# Patient Record
Sex: FEMALE | Race: WHITE | NOT HISPANIC OR LATINO | Employment: FULL TIME | URBAN - METROPOLITAN AREA
[De-identification: names, ages, dates, MRNs, and addresses within clinical notes are randomized per-mention and may not be internally consistent; named-entity substitution may affect disease eponyms.]

---

## 2018-01-14 ENCOUNTER — HOSPITAL ENCOUNTER (EMERGENCY)
Facility: HOSPITAL | Age: 58
Discharge: HOME | End: 2018-01-14
Attending: EMERGENCY MEDICINE | Admitting: EMERGENCY MEDICINE

## 2018-01-14 VITALS
WEIGHT: 112 LBS | TEMPERATURE: 98.2 F | HEIGHT: 62 IN | SYSTOLIC BLOOD PRESSURE: 170 MMHG | DIASTOLIC BLOOD PRESSURE: 100 MMHG | OXYGEN SATURATION: 97 % | BODY MASS INDEX: 20.61 KG/M2 | RESPIRATION RATE: 16 BRPM | HEART RATE: 83 BPM

## 2018-01-14 DIAGNOSIS — E87.6 HYPOKALEMIA: Primary | ICD-10-CM

## 2018-01-14 LAB
ALBUMIN SERPL-MCNC: 5.1 G/DL (ref 3.5–5.2)
ALBUMIN/GLOB SERPL: 1.5 G/DL
ALP SERPL-CCNC: 96 U/L (ref 39–117)
ALT SERPL W P-5'-P-CCNC: 10 U/L (ref 1–33)
ANION GAP SERPL CALCULATED.3IONS-SCNC: 22 MMOL/L
AST SERPL-CCNC: 19 U/L (ref 1–32)
BASOPHILS # BLD AUTO: 0.02 10*3/MM3 (ref 0–0.2)
BASOPHILS NFR BLD AUTO: 0.3 % (ref 0–1.5)
BILIRUB SERPL-MCNC: 0.3 MG/DL (ref 0.1–1.2)
BUN BLD-MCNC: 8 MG/DL (ref 6–20)
BUN/CREAT SERPL: 10.7 (ref 7–25)
CALCIUM SPEC-SCNC: 9.2 MG/DL (ref 8.6–10.5)
CHLORIDE SERPL-SCNC: 90 MMOL/L (ref 98–107)
CO2 SERPL-SCNC: 26 MMOL/L (ref 22–29)
CREAT BLD-MCNC: 0.75 MG/DL (ref 0.57–1)
DEPRECATED RDW RBC AUTO: 44.8 FL (ref 37–54)
EOSINOPHIL # BLD AUTO: 0.03 10*3/MM3 (ref 0–0.7)
EOSINOPHIL NFR BLD AUTO: 0.4 % (ref 0.3–6.2)
ERYTHROCYTE [DISTWIDTH] IN BLOOD BY AUTOMATED COUNT: 12.9 % (ref 11.7–13)
ETHANOL BLD-MCNC: 65 MG/DL (ref 0–10)
ETHANOL UR QL: 0.07 %
GFR SERPL CREATININE-BSD FRML MDRD: 80 ML/MIN/1.73
GLOBULIN UR ELPH-MCNC: 3.3 GM/DL
GLUCOSE BLD-MCNC: 104 MG/DL (ref 65–99)
HCT VFR BLD AUTO: 42.9 % (ref 35.6–45.5)
HGB BLD-MCNC: 14.7 G/DL (ref 11.9–15.5)
IMM GRANULOCYTES # BLD: 0 10*3/MM3 (ref 0–0.03)
IMM GRANULOCYTES NFR BLD: 0 % (ref 0–0.5)
LYMPHOCYTES # BLD AUTO: 2.97 10*3/MM3 (ref 0.9–4.8)
LYMPHOCYTES NFR BLD AUTO: 37.2 % (ref 19.6–45.3)
MCH RBC QN AUTO: 32.5 PG (ref 26.9–32)
MCHC RBC AUTO-ENTMCNC: 34.3 G/DL (ref 32.4–36.3)
MCV RBC AUTO: 94.9 FL (ref 80.5–98.2)
MONOCYTES # BLD AUTO: 0.55 10*3/MM3 (ref 0.2–1.2)
MONOCYTES NFR BLD AUTO: 6.9 % (ref 5–12)
NEUTROPHILS # BLD AUTO: 4.41 10*3/MM3 (ref 1.9–8.1)
NEUTROPHILS NFR BLD AUTO: 55.2 % (ref 42.7–76)
PLATELET # BLD AUTO: 250 10*3/MM3 (ref 140–500)
PMV BLD AUTO: 9.9 FL (ref 6–12)
POTASSIUM BLD-SCNC: 2.9 MMOL/L (ref 3.5–5.2)
PROT SERPL-MCNC: 8.4 G/DL (ref 6–8.5)
RBC # BLD AUTO: 4.52 10*6/MM3 (ref 3.9–5.2)
SODIUM BLD-SCNC: 138 MMOL/L (ref 136–145)
WBC NRBC COR # BLD: 7.98 10*3/MM3 (ref 4.5–10.7)

## 2018-01-14 PROCEDURE — 85025 COMPLETE CBC W/AUTO DIFF WBC: CPT | Performed by: NURSE PRACTITIONER

## 2018-01-14 PROCEDURE — 80307 DRUG TEST PRSMV CHEM ANLYZR: CPT | Performed by: NURSE PRACTITIONER

## 2018-01-14 PROCEDURE — 80053 COMPREHEN METABOLIC PANEL: CPT | Performed by: NURSE PRACTITIONER

## 2018-01-14 PROCEDURE — 99284 EMERGENCY DEPT VISIT MOD MDM: CPT

## 2018-01-14 RX ORDER — MELATONIN
1000 DAILY
COMMUNITY

## 2018-01-14 RX ORDER — POTASSIUM CHLORIDE 1.5 G/1.77G
40 POWDER, FOR SOLUTION ORAL ONCE
Status: COMPLETED | OUTPATIENT
Start: 2018-01-14 | End: 2018-01-14

## 2018-01-14 RX ORDER — POTASSIUM CHLORIDE 750 MG/1
40 CAPSULE, EXTENDED RELEASE ORAL ONCE
Status: DISCONTINUED | OUTPATIENT
Start: 2018-01-14 | End: 2018-01-14 | Stop reason: HOSPADM

## 2018-01-14 RX ADMIN — POTASSIUM CHLORIDE 40 MEQ: 1.5 POWDER, FOR SOLUTION ORAL at 20:29

## 2018-01-14 NOTE — ED NOTES
Pt c/o left hand cramping and tingling since earlier today.  Pt denies chest pain or SOA. Pt has missed 2 doses of hydrochlorothiazide.      Jackie Davison RN  01/14/18 5901       Jackie Davison RN  01/14/18 6993

## 2018-01-15 NOTE — ED PROVIDER NOTES
" EMERGENCY DEPARTMENT ENCOUNTER    CHIEF COMPLAINT  Chief Complaint: Left hand cramping  History given by: Patient  History limited by: Nothing  Room Number: 17/17  PMD: Patience Brown MD      HPI:  Pt is a 57 y.o. female who presents complaining of left hand cramping and tingling that began today. Pt reports associated nausea.  Pt denies diarrhea, vomiting, and chest pain.  Pt states that she had not taken her potassium for the past few days    Duration:  PTA  Onset: Gradual  Timing: Constant  Location: Left hand  Radiation: None  Quality: \"Crampy\"  Intensity/Severity: Mild  Progression: Unchanged  Associated Symptoms: Nausea  Aggravating Factors: None  Alleviating Factors: None  Previous Episodes: None  Treatment before arrival: None      PAST MEDICAL HISTORY  Active Ambulatory Problems     Diagnosis Date Noted   • No Active Ambulatory Problems     Resolved Ambulatory Problems     Diagnosis Date Noted   • No Resolved Ambulatory Problems     Past Medical History:   Diagnosis Date   • Hypertension    • Osteoporosis        PAST SURGICAL HISTORY  Past Surgical History:   Procedure Laterality Date   • FEMUR FRACTURE SURGERY     • TUBAL ABDOMINAL LIGATION         FAMILY HISTORY  History reviewed. No pertinent family history.    SOCIAL HISTORY  Social History     Social History   • Marital status: Single     Spouse name: N/A   • Number of children: N/A   • Years of education: N/A     Occupational History   • Not on file.     Social History Main Topics   • Smoking status: Current Every Day Smoker     Packs/day: 1.00   • Smokeless tobacco: Not on file   • Alcohol use Yes      Comment: 2-3 beers per day   • Drug use: Yes     Special: Marijuana   • Sexual activity: Defer     Other Topics Concern   • Not on file     Social History Narrative   • No narrative on file       ALLERGIES  Lisinopril and Paroxetine    REVIEW OF SYSTEMS  Review of Systems   Constitutional: Negative for fever.   HENT: Negative for sore throat.  "   Eyes: Negative.    Respiratory: Negative for cough and shortness of breath.    Cardiovascular: Negative for chest pain.   Gastrointestinal: Positive for nausea. Negative for abdominal pain, diarrhea and vomiting.   Genitourinary: Negative for dysuria.   Musculoskeletal: Negative for neck pain.        Left hand cramp   Skin: Negative for rash.   Allergic/Immunologic: Negative.    Neurological: Negative for weakness, numbness and headaches.   Hematological: Negative.    Psychiatric/Behavioral: Negative.    All other systems reviewed and are negative.      PHYSICAL EXAM  ED Triage Vitals   Temp Heart Rate Resp BP SpO2   01/14/18 1755 01/14/18 1755 01/14/18 1755 01/14/18 1755 01/14/18 1755   98.1 °F (36.7 °C) 80 16 176/111 99 %      Temp src Heart Rate Source Patient Position BP Location FiO2 (%)   01/14/18 1755 01/14/18 2011 01/14/18 2011 01/14/18 2011 --   Oral Monitor Lying Left arm        Physical Exam   Constitutional: She is oriented to person, place, and time and well-developed, well-nourished, and in no distress. No distress.   HENT:   Head: Normocephalic and atraumatic.   Eyes: EOM are normal. Pupils are equal, round, and reactive to light.   Neck: Normal range of motion. Neck supple.   Cardiovascular: Normal rate, regular rhythm and normal heart sounds.    Pulmonary/Chest: Effort normal and breath sounds normal. No respiratory distress.   Abdominal: Soft. There is no tenderness. There is no rebound and no guarding.   Musculoskeletal: Normal range of motion. She exhibits no edema.   Neurological: She is alert and oriented to person, place, and time. She has normal sensation and normal strength.   Skin: Skin is warm and dry. No rash noted.   Psychiatric: Mood and affect normal.   Nursing note and vitals reviewed.      LAB RESULTS  Lab Results (last 24 hours)     Procedure Component Value Units Date/Time    CBC & Differential [[id number]] Collected:  01/14/18 1834    Specimen:  Blood Updated:  01/14/18 1852     Narrative:       The following orders were created for panel order CBC & Differential.  Procedure                               Abnormality         Status                     ---------                               -----------         ------                     CBC Auto Differential[[id number]]        Abnormal            Final result                 Please view results for these tests on the individual orders.    Comprehensive Metabolic Panel [[id number]]  (Abnormal) Collected:  01/14/18 1834    Specimen:  Blood Updated:  01/14/18 1910     Glucose 104 (H) mg/dL      BUN 8 mg/dL      Creatinine 0.75 mg/dL      Sodium 138 mmol/L      Potassium 2.9 (L) mmol/L      Chloride 90 (L) mmol/L      CO2 26.0 mmol/L      Calcium 9.2 mg/dL      Total Protein 8.4 g/dL      Albumin 5.10 g/dL      ALT (SGPT) 10 U/L      AST (SGOT) 19 U/L      Alkaline Phosphatase 96 U/L      Total Bilirubin 0.3 mg/dL      eGFR Non African Amer 80 mL/min/1.73      Globulin 3.3 gm/dL      A/G Ratio 1.5 g/dL      BUN/Creatinine Ratio 10.7     Anion Gap 22.0 mmol/L     Ethanol [[id number]]  (Abnormal) Collected:  01/14/18 1834    Specimen:  Blood Updated:  01/14/18 1910     Ethanol 65 (H) mg/dL      Ethanol % 0.065 %     CBC Auto Differential [[id number]]  (Abnormal) Collected:  01/14/18 1834    Specimen:  Blood Updated:  01/14/18 1852     WBC 7.98 10*3/mm3      RBC 4.52 10*6/mm3      Hemoglobin 14.7 g/dL      Hematocrit 42.9 %      MCV 94.9 fL      MCH 32.5 (H) pg      MCHC 34.3 g/dL      RDW 12.9 %      RDW-SD 44.8 fl      MPV 9.9 fL      Platelets 250 10*3/mm3      Neutrophil % 55.2 %      Lymphocyte % 37.2 %      Monocyte % 6.9 %      Eosinophil % 0.4 %      Basophil % 0.3 %      Immature Grans % 0.0 %      Neutrophils, Absolute 4.41 10*3/mm3      Lymphocytes, Absolute 2.97 10*3/mm3      Monocytes, Absolute 0.55 10*3/mm3      Eosinophils, Absolute 0.03 10*3/mm3      Basophils, Absolute 0.02 10*3/mm3      Immature Grans, Absolute 0.00 10*3/mm3            I ordered the above labs and reviewed the results      PROCEDURES  Procedures      PROGRESS AND CONSULTS  ED Course       2026   BP- (!) 173/103 HR- 83 Temp- 98.2 °F (36.8 °C) (Tympanic) O2 sat- 97%.  Discussed pertinent lab results. Advised Pt to double up on potassium for the next few days. Discussed the plan to discharge. Patient agrees with plan and all questions were addressed.         MEDICAL DECISION MAKING  Results were reviewed/discussed with the patient and they were also made aware of online access. Pt also made aware that some labs, such as cultures, will not be resulted during ER visit and follow up with PMD is necessary.     MDM       DIAGNOSIS  Final diagnoses:   Hypokalemia       DISPOSITION  DISCHARGE    Patient discharged in stable condition.    Reviewed implications of results, diagnosis, meds, responsibility to follow up, warning signs and symptoms of possible worsening, potential complications and reasons to return to ER.    Patient/Family voiced understanding of above instructions.    Discussed plan for discharge, as there is no emergent indication for admission.  Pt/family is agreeable and understands need for follow up and repeat testing.  Pt is aware that discharge does not mean that nothing is wrong but it indicates no emergency is present that requires admission and they must continue care with follow-up as given below or physician of their choice.     FOLLOW-UP  Patience Brown MD  [address] [address]  [phone]               Medication List      Notice     No changes were made to your prescriptions during this visit.            Latest Documented Vital Signs:  As of 8:55 PM  BP- (!) 173/103 HR- 83 Temp- 98.2 °F (36.8 °C) (Tympanic) O2 sat- 97%    --  Documentation assistance provided by helen Velazquez for Dr. Simons.  Information recorded by the helen was done at my direction and has been verified and validated by me.     Winnie  Marcus  01/14/18 2052       Virgilio Simons MD  01/14/18 7852

## 2021-10-11 ENCOUNTER — ANESTHESIA EVENT (OUTPATIENT)
Dept: PERIOP | Facility: HOSPITAL | Age: 61
End: 2021-10-11

## 2021-10-11 ENCOUNTER — ANESTHESIA (OUTPATIENT)
Dept: PERIOP | Facility: HOSPITAL | Age: 61
End: 2021-10-11

## 2021-10-11 ENCOUNTER — HOSPITAL ENCOUNTER (EMERGENCY)
Facility: HOSPITAL | Age: 61
Discharge: HOME | End: 2021-10-11
Attending: EMERGENCY MEDICINE | Admitting: INTERNAL MEDICINE

## 2021-10-11 VITALS
WEIGHT: 115 LBS | HEIGHT: 62 IN | OXYGEN SATURATION: 91 % | RESPIRATION RATE: 16 BRPM | BODY MASS INDEX: 21.16 KG/M2 | TEMPERATURE: 97.7 F | HEART RATE: 80 BPM | DIASTOLIC BLOOD PRESSURE: 83 MMHG | SYSTOLIC BLOOD PRESSURE: 141 MMHG

## 2021-10-11 DIAGNOSIS — T18.128A ESOPHAGEAL OBSTRUCTION DUE TO FOOD IMPACTION: Primary | ICD-10-CM

## 2021-10-11 DIAGNOSIS — K22.2 ESOPHAGEAL OBSTRUCTION DUE TO FOOD IMPACTION: Primary | ICD-10-CM
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
## 2021-10-11 LAB
ALBUMIN SERPL-MCNC: 4.9 G/DL (ref 3.5–5.2)
ALBUMIN/GLOB SERPL: 1.8 G/DL
ALP SERPL-CCNC: 92 U/L (ref 39–117)
ALT SERPL W P-5'-P-CCNC: 14 U/L (ref 1–33)
ANION GAP SERPL CALCULATED.3IONS-SCNC: 14 MMOL/L (ref 5–15)
AST SERPL-CCNC: 19 U/L (ref 1–32)
BASOPHILS # BLD AUTO: 0.02 10*3/MM3 (ref 0–0.2)
BASOPHILS NFR BLD AUTO: 0.3 % (ref 0–1.5)
BILIRUB SERPL-MCNC: 0.5 MG/DL (ref 0–1.2)
BUN SERPL-MCNC: 10 MG/DL (ref 6–20)
BUN/CREAT SERPL: 19.2 (ref 7–25)
CALCIUM SPEC-SCNC: 9 MG/DL (ref 8.6–10.5)
CHLORIDE SERPL-SCNC: 101 MMOL/L (ref 98–107)
CO2 SERPL-SCNC: 28 MMOL/L (ref 22–29)
CREAT SERPL-MCNC: 0.52 MG/DL (ref 0.57–1)
DEPRECATED RDW RBC AUTO: 40.1 FL (ref 37–54)
EOSINOPHIL # BLD AUTO: 0.05 10*3/MM3 (ref 0–0.4)
EOSINOPHIL NFR BLD AUTO: 0.8 % (ref 0.3–6.2)
ERYTHROCYTE [DISTWIDTH] IN BLOOD BY AUTOMATED COUNT: 12 % (ref 12.3–15.4)
GFR SERPL CREATININE-BSD FRML MDRD: 121 ML/MIN/1.73
GLOBULIN UR ELPH-MCNC: 2.7 GM/DL
GLUCOSE SERPL-MCNC: 97 MG/DL (ref 65–99)
HCT VFR BLD AUTO: 40.1 % (ref 34–46.6)
HGB BLD-MCNC: 13.8 G/DL (ref 12–15.9)
IMM GRANULOCYTES # BLD AUTO: 0.02 10*3/MM3 (ref 0–0.05)
IMM GRANULOCYTES NFR BLD AUTO: 0.3 % (ref 0–0.5)
LYMPHOCYTES # BLD AUTO: 1.85 10*3/MM3 (ref 0.7–3.1)
LYMPHOCYTES NFR BLD AUTO: 30.6 % (ref 19.6–45.3)
MCH RBC QN AUTO: 31.7 PG (ref 26.6–33)
MCHC RBC AUTO-ENTMCNC: 34.4 G/DL (ref 31.5–35.7)
MCV RBC AUTO: 92 FL (ref 79–97)
MONOCYTES # BLD AUTO: 0.46 10*3/MM3 (ref 0.1–0.9)
MONOCYTES NFR BLD AUTO: 7.6 % (ref 5–12)
NEUTROPHILS NFR BLD AUTO: 3.65 10*3/MM3 (ref 1.7–7)
NEUTROPHILS NFR BLD AUTO: 60.4 % (ref 42.7–76)
NRBC BLD AUTO-RTO: 0 /100 WBC (ref 0–0.2)
PLATELET # BLD AUTO: 259 10*3/MM3 (ref 140–450)
PMV BLD AUTO: 9.2 FL (ref 6–12)
POTASSIUM SERPL-SCNC: 3.1 MMOL/L (ref 3.5–5.2)
PROT SERPL-MCNC: 7.6 G/DL (ref 6–8.5)
RBC # BLD AUTO: 4.36 10*6/MM3 (ref 3.77–5.28)
SARS-COV-2 RNA RESP QL NAA+PROBE: NOT DETECTED
SODIUM SERPL-SCNC: 143 MMOL/L (ref 136–145)
WBC # BLD AUTO: 6.05 10*3/MM3 (ref 3.4–10.8)

## 2021-10-11 PROCEDURE — 25010000002 FENTANYL CITRATE (PF) 50 MCG/ML SOLUTION: Performed by: ANESTHESIOLOGY

## 2021-10-11 PROCEDURE — U0003 INFECTIOUS AGENT DETECTION BY NUCLEIC ACID (DNA OR RNA); SEVERE ACUTE RESPIRATORY SYNDROME CORONAVIRUS 2 (SARS-COV-2) (CORONAVIRUS DISEASE [COVID-19]), AMPLIFIED PROBE TECHNIQUE, MAKING USE OF HIGH THROUGHPUT TECHNOLOGIES AS DESCRIBED BY CMS-2020-01-R: HCPCS | Performed by: PHYSICIAN ASSISTANT

## 2021-10-11 PROCEDURE — 25010000002 ONDANSETRON PER 1 MG: Performed by: ANESTHESIOLOGY

## 2021-10-11 PROCEDURE — 99284 EMERGENCY DEPT VISIT MOD MDM: CPT

## 2021-10-11 PROCEDURE — 80053 COMPREHEN METABOLIC PANEL: CPT | Performed by: EMERGENCY MEDICINE

## 2021-10-11 PROCEDURE — 25010000002 LORAZEPAM PER 2 MG: Performed by: EMERGENCY MEDICINE

## 2021-10-11 PROCEDURE — 25010000002 DEXAMETHASONE PER 1 MG: Performed by: ANESTHESIOLOGY

## 2021-10-11 PROCEDURE — 25010000002 MIDAZOLAM PER 1 MG: Performed by: ANESTHESIOLOGY

## 2021-10-11 PROCEDURE — 99284 EMERGENCY DEPT VISIT MOD MDM: CPT | Performed by: INTERNAL MEDICINE

## 2021-10-11 PROCEDURE — 85025 COMPLETE CBC W/AUTO DIFF WBC: CPT | Performed by: EMERGENCY MEDICINE

## 2021-10-11 PROCEDURE — 25010000002 PROPOFOL 1000 MG/100ML EMULSION: Performed by: ANESTHESIOLOGY

## 2021-10-11 PROCEDURE — 96374 THER/PROPH/DIAG INJ IV PUSH: CPT

## 2021-10-11 PROCEDURE — C9803 HOPD COVID-19 SPEC COLLECT: HCPCS

## 2021-10-11 PROCEDURE — 25010000002 SUCCINYLCHOLINE PER 20 MG: Performed by: ANESTHESIOLOGY

## 2021-10-11 PROCEDURE — 43247 EGD REMOVE FOREIGN BODY: CPT | Performed by: INTERNAL MEDICINE

## 2021-10-11 RX ORDER — FAMOTIDINE 10 MG/ML
20 INJECTION, SOLUTION INTRAVENOUS ONCE
Status: COMPLETED | OUTPATIENT
Start: 2021-10-11 | End: 2021-10-11

## 2021-10-11 RX ORDER — FAMOTIDINE 10 MG/ML
20 INJECTION, SOLUTION INTRAVENOUS ONCE
Status: DISCONTINUED | OUTPATIENT
Start: 2021-10-11 | End: 2021-10-11 | Stop reason: HOSPADM

## 2021-10-11 RX ORDER — SODIUM CHLORIDE 0.9 % (FLUSH) 0.9 %
10 SYRINGE (ML) INJECTION AS NEEDED
Status: DISCONTINUED | OUTPATIENT
Start: 2021-10-11 | End: 2021-10-11 | Stop reason: HOSPADM

## 2021-10-11 RX ORDER — PROMETHAZINE HYDROCHLORIDE 25 MG/1
25 TABLET ORAL ONCE AS NEEDED
Status: DISCONTINUED | OUTPATIENT
Start: 2021-10-11 | End: 2021-10-11 | Stop reason: HOSPADM

## 2021-10-11 RX ORDER — SODIUM CHLORIDE, SODIUM LACTATE, POTASSIUM CHLORIDE, CALCIUM CHLORIDE 600; 310; 30; 20 MG/100ML; MG/100ML; MG/100ML; MG/100ML
30 INJECTION, SOLUTION INTRAVENOUS CONTINUOUS
Status: CANCELLED | OUTPATIENT
Start: 2021-10-11

## 2021-10-11 RX ORDER — ONDANSETRON 2 MG/ML
INJECTION INTRAMUSCULAR; INTRAVENOUS AS NEEDED
Status: DISCONTINUED | OUTPATIENT
Start: 2021-10-11 | End: 2021-10-11 | Stop reason: SURG

## 2021-10-11 RX ORDER — MIDAZOLAM HYDROCHLORIDE 1 MG/ML
1 INJECTION INTRAMUSCULAR; INTRAVENOUS
Status: DISCONTINUED | OUTPATIENT
Start: 2021-10-11 | End: 2021-10-11 | Stop reason: HOSPADM

## 2021-10-11 RX ORDER — PANTOPRAZOLE SODIUM 20 MG/1
20 TABLET, DELAYED RELEASE ORAL
Qty: 90 TABLET | Refills: 0 | Status: SHIPPED | OUTPATIENT
Start: 2021-10-11 | End: 2021-10-28 | Stop reason: SDUPTHER

## 2021-10-11 RX ORDER — SUCCINYLCHOLINE CHLORIDE 20 MG/ML
INJECTION INTRAMUSCULAR; INTRAVENOUS AS NEEDED
Status: DISCONTINUED | OUTPATIENT
Start: 2021-10-11 | End: 2021-10-11 | Stop reason: SURG

## 2021-10-11 RX ORDER — HYDROMORPHONE HYDROCHLORIDE 1 MG/ML
0.25 INJECTION, SOLUTION INTRAMUSCULAR; INTRAVENOUS; SUBCUTANEOUS
Status: DISCONTINUED | OUTPATIENT
Start: 2021-10-11 | End: 2021-10-11 | Stop reason: HOSPADM

## 2021-10-11 RX ORDER — DIPHENHYDRAMINE HCL 25 MG
25 CAPSULE ORAL
Status: DISCONTINUED | OUTPATIENT
Start: 2021-10-11 | End: 2021-10-11 | Stop reason: HOSPADM

## 2021-10-11 RX ORDER — DIPHENHYDRAMINE HYDROCHLORIDE 50 MG/ML
12.5 INJECTION INTRAMUSCULAR; INTRAVENOUS
Status: DISCONTINUED | OUTPATIENT
Start: 2021-10-11 | End: 2021-10-11 | Stop reason: HOSPADM

## 2021-10-11 RX ORDER — LIDOCAINE HYDROCHLORIDE 10 MG/ML
0.5 INJECTION, SOLUTION EPIDURAL; INFILTRATION; INTRACAUDAL; PERINEURAL ONCE AS NEEDED
Status: DISCONTINUED | OUTPATIENT
Start: 2021-10-11 | End: 2021-10-11 | Stop reason: HOSPADM

## 2021-10-11 RX ORDER — HYDRALAZINE HYDROCHLORIDE 20 MG/ML
5 INJECTION INTRAMUSCULAR; INTRAVENOUS
Status: DISCONTINUED | OUTPATIENT
Start: 2021-10-11 | End: 2021-10-11 | Stop reason: HOSPADM

## 2021-10-11 RX ORDER — PROPOFOL 10 MG/ML
INJECTION, EMULSION INTRAVENOUS AS NEEDED
Status: DISCONTINUED | OUTPATIENT
Start: 2021-10-11 | End: 2021-10-11 | Stop reason: SURG

## 2021-10-11 RX ORDER — FENTANYL CITRATE 50 UG/ML
50 INJECTION, SOLUTION INTRAMUSCULAR; INTRAVENOUS
Status: DISCONTINUED | OUTPATIENT
Start: 2021-10-11 | End: 2021-10-11 | Stop reason: HOSPADM

## 2021-10-11 RX ORDER — FLUMAZENIL 0.1 MG/ML
0.2 INJECTION INTRAVENOUS AS NEEDED
Status: DISCONTINUED | OUTPATIENT
Start: 2021-10-11 | End: 2021-10-11 | Stop reason: HOSPADM

## 2021-10-11 RX ORDER — PROMETHAZINE HYDROCHLORIDE 25 MG/1
25 SUPPOSITORY RECTAL ONCE AS NEEDED
Status: DISCONTINUED | OUTPATIENT
Start: 2021-10-11 | End: 2021-10-11 | Stop reason: HOSPADM

## 2021-10-11 RX ORDER — SODIUM CHLORIDE 0.9 % (FLUSH) 0.9 %
10 SYRINGE (ML) INJECTION EVERY 12 HOURS SCHEDULED
Status: DISCONTINUED | OUTPATIENT
Start: 2021-10-11 | End: 2021-10-11 | Stop reason: HOSPADM

## 2021-10-11 RX ORDER — DEXAMETHASONE SODIUM PHOSPHATE 4 MG/ML
INJECTION, SOLUTION INTRA-ARTICULAR; INTRALESIONAL; INTRAMUSCULAR; INTRAVENOUS; SOFT TISSUE AS NEEDED
Status: DISCONTINUED | OUTPATIENT
Start: 2021-10-11 | End: 2021-10-11 | Stop reason: SURG

## 2021-10-11 RX ORDER — FENTANYL CITRATE 50 UG/ML
INJECTION, SOLUTION INTRAMUSCULAR; INTRAVENOUS AS NEEDED
Status: DISCONTINUED | OUTPATIENT
Start: 2021-10-11 | End: 2021-10-11 | Stop reason: SURG

## 2021-10-11 RX ORDER — ONDANSETRON 2 MG/ML
4 INJECTION INTRAMUSCULAR; INTRAVENOUS ONCE AS NEEDED
Status: DISCONTINUED | OUTPATIENT
Start: 2021-10-11 | End: 2021-10-11 | Stop reason: HOSPADM

## 2021-10-11 RX ORDER — SODIUM CHLORIDE, SODIUM LACTATE, POTASSIUM CHLORIDE, CALCIUM CHLORIDE 600; 310; 30; 20 MG/100ML; MG/100ML; MG/100ML; MG/100ML
9 INJECTION, SOLUTION INTRAVENOUS CONTINUOUS PRN
Status: DISCONTINUED | OUTPATIENT
Start: 2021-10-11 | End: 2021-10-11 | Stop reason: HOSPADM

## 2021-10-11 RX ORDER — EPHEDRINE SULFATE 50 MG/ML
5 INJECTION, SOLUTION INTRAVENOUS ONCE AS NEEDED
Status: DISCONTINUED | OUTPATIENT
Start: 2021-10-11 | End: 2021-10-11 | Stop reason: HOSPADM

## 2021-10-11 RX ORDER — LIDOCAINE HYDROCHLORIDE 20 MG/ML
INJECTION, SOLUTION INFILTRATION; PERINEURAL AS NEEDED
Status: DISCONTINUED | OUTPATIENT
Start: 2021-10-11 | End: 2021-10-11 | Stop reason: SURG

## 2021-10-11 RX ORDER — SODIUM CHLORIDE 0.9 % (FLUSH) 0.9 %
3 SYRINGE (ML) INJECTION EVERY 12 HOURS SCHEDULED
Status: DISCONTINUED | OUTPATIENT
Start: 2021-10-11 | End: 2021-10-11 | Stop reason: HOSPADM

## 2021-10-11 RX ORDER — SODIUM CHLORIDE, SODIUM LACTATE, POTASSIUM CHLORIDE, CALCIUM CHLORIDE 600; 310; 30; 20 MG/100ML; MG/100ML; MG/100ML; MG/100ML
9 INJECTION, SOLUTION INTRAVENOUS CONTINUOUS
Status: DISCONTINUED | OUTPATIENT
Start: 2021-10-11 | End: 2021-10-11 | Stop reason: HOSPADM

## 2021-10-11 RX ORDER — HYDROCODONE BITARTRATE AND ACETAMINOPHEN 7.5; 325 MG/1; MG/1
1 TABLET ORAL ONCE AS NEEDED
Status: DISCONTINUED | OUTPATIENT
Start: 2021-10-11 | End: 2021-10-11 | Stop reason: HOSPADM

## 2021-10-11 RX ORDER — OXYCODONE AND ACETAMINOPHEN 10; 325 MG/1; MG/1
1 TABLET ORAL EVERY 4 HOURS PRN
Status: DISCONTINUED | OUTPATIENT
Start: 2021-10-11 | End: 2021-10-11 | Stop reason: HOSPADM

## 2021-10-11 RX ORDER — SODIUM CHLORIDE 0.9 % (FLUSH) 0.9 %
3-10 SYRINGE (ML) INJECTION AS NEEDED
Status: DISCONTINUED | OUTPATIENT
Start: 2021-10-11 | End: 2021-10-11 | Stop reason: HOSPADM

## 2021-10-11 RX ORDER — NALOXONE HCL 0.4 MG/ML
0.2 VIAL (ML) INJECTION AS NEEDED
Status: DISCONTINUED | OUTPATIENT
Start: 2021-10-11 | End: 2021-10-11 | Stop reason: HOSPADM

## 2021-10-11 RX ORDER — LABETALOL HYDROCHLORIDE 5 MG/ML
5 INJECTION, SOLUTION INTRAVENOUS
Status: DISCONTINUED | OUTPATIENT
Start: 2021-10-11 | End: 2021-10-11 | Stop reason: HOSPADM

## 2021-10-11 RX ORDER — LORAZEPAM 2 MG/ML
1 INJECTION INTRAMUSCULAR ONCE
Status: COMPLETED | OUTPATIENT
Start: 2021-10-11 | End: 2021-10-11

## 2021-10-11 RX ADMIN — SUCCINYLCHOLINE CHLORIDE 120 MG: 20 INJECTION, SOLUTION INTRAMUSCULAR; INTRAVENOUS; PARENTERAL at 11:34

## 2021-10-11 RX ADMIN — PROPOFOL 150 MG: 10 INJECTION, EMULSION INTRAVENOUS at 11:34

## 2021-10-11 RX ADMIN — SODIUM CHLORIDE, POTASSIUM CHLORIDE, SODIUM LACTATE AND CALCIUM CHLORIDE: 600; 310; 30; 20 INJECTION, SOLUTION INTRAVENOUS at 11:26

## 2021-10-11 RX ADMIN — ONDANSETRON 4 MG: 2 INJECTION INTRAMUSCULAR; INTRAVENOUS at 11:39

## 2021-10-11 RX ADMIN — LORAZEPAM 1 MG: 2 INJECTION INTRAMUSCULAR; INTRAVENOUS at 07:37

## 2021-10-11 RX ADMIN — LIDOCAINE HYDROCHLORIDE 50 MG: 20 INJECTION, SOLUTION INFILTRATION; PERINEURAL at 11:34

## 2021-10-11 RX ADMIN — FAMOTIDINE 20 MG: 10 INJECTION INTRAVENOUS at 10:57

## 2021-10-11 RX ADMIN — FENTANYL CITRATE 50 MCG: 0.05 INJECTION, SOLUTION INTRAMUSCULAR; INTRAVENOUS at 11:32

## 2021-10-11 RX ADMIN — FENTANYL CITRATE 50 MCG: 0.05 INJECTION, SOLUTION INTRAMUSCULAR; INTRAVENOUS at 11:51

## 2021-10-11 RX ADMIN — MIDAZOLAM 1 MG: 1 INJECTION INTRAMUSCULAR; INTRAVENOUS at 11:11

## 2021-10-11 RX ADMIN — DEXAMETHASONE SODIUM PHOSPHATE 8 MG: 4 INJECTION, SOLUTION INTRAMUSCULAR; INTRAVENOUS at 11:39

## 2021-10-11 RX ADMIN — SODIUM CHLORIDE, POTASSIUM CHLORIDE, SODIUM LACTATE AND CALCIUM CHLORIDE 9 ML/HR: 600; 310; 30; 20 INJECTION, SOLUTION INTRAVENOUS at 11:11

## 2021-10-11 NOTE — ED TRIAGE NOTES
Pt presents to ED with complaints of having a piece of steak stuck in her throat since last PM. Pt is able to talk and secretions are controlled at this time.    RN wore appropriate PPE during entire encounter with patient. Patient compliant with wearing mask at this time. Proper hand hygiene performed before encounter, as deemed necessary during encounter and after completion of encounter with patient.

## 2021-10-11 NOTE — ANESTHESIA PREPROCEDURE EVALUATION
" Anesthesia Evaluation     Patient summary reviewed and Nursing notes reviewed   NPO Solid Status: > 8 hours  NPO Liquid Status: > 8 hours           Airway   Mallampati: II  TM distance: >3 FB  Neck ROM: full  No difficulty expected  Dental    (+) poor dentition        Pulmonary    (+) a smoker Current, COPD, decreased breath sounds,   Cardiovascular - normal exam  Exercise tolerance: good (4-7 METS)    (+) hypertension,       Neuro/Psych- negative ROS  GI/Hepatic/Renal/Endo - negative ROS     Musculoskeletal (-) negative ROS    Abdominal    Substance History   (+) alcohol use, drug use     OB/GYN negative ob/gyn ROS         Other - negative ROS           Phys Exam Other: No \"loose\" teeth per patient                Anesthesia Plan    ASA 3 - emergent     general   Rapid sequence(GETA-RSI    I have reviewed the patient's history with the patient and the chart, including all pertinent laboratory results and imaging. I have explained the risks of anesthesia including but not limited to dental damage, corneal abrasion, nerve injury, MI, stroke, and death. Questions asked and answered. Anesthetic plan discussed with patient and team as indicated. Patient expressed understanding of the above.  )  intravenous induction     Anesthetic plan, all risks, benefits, and alternatives have been provided, discussed and informed consent has been obtained with: patient.      "

## 2021-10-11 NOTE — CONSULTS
[facility]  Initial Inpatient Consult Note    Referring Provider: Dr Duffy    Reason for Consultation: Food bolus    Subjective     History of present illness:      Thank you for requesting my opinion.    This is a 60-year-old woman with a past medical history as below including tobacco abuse and daily alcohol use here for esophageal food bolus.  She was eating steak last evening.  She felt a piece of a get stuck.  Since that time, she has been unable to tolerate secretions.  She feels that it is stuck in her mid esophageal region.  She tried to vomit without success.  She has had these episodes occur intermittently, approximately 1-2 times per month but each time either she has been able to vomit the food up or have passed out.  She denies that she has any issues with heartburn or reflux.  She has never had an EGD evaluation.  She denies a family history of any GI malignancies though reports that her mother did have issues with reflux and a hiatal hernia.    She smokes 1 pack/day.  She drinks 2-3 beers per day.    Past Medical History:  Past Medical History:   Diagnosis Date   • Hypertension    • Osteoporosis        Past Surgical History:  Past Surgical History:   Procedure Laterality Date   • FEMUR FRACTURE SURGERY     • TUBAL ABDOMINAL LIGATION          Social History:   Social History     Tobacco Use   • Smoking status: Current Every Day Smoker     Packs/day: 1.00   • Smokeless tobacco: Not on file   Substance Use Topics   • Alcohol use: Yes     Comment: 2-3 beers per day        Family History:  No family history on file.    Home Meds:  Alendronate Sodium (FOSAMAX PO)    cholecalciferol (VITAMIN D3) 1000 units tablet    HYDROCHLOROTHIAZIDE PO    POTASSIUM PO    Current Meds:        Allergies:  Allergies   Allergen Reactions   • Lisinopril Swelling     Lip swelling   • Paroxetine Other (See Comments)     Ataxia       Review of Systems  All systems were reviewed and negative except for:   Gastrointestinal: positive for  dysphagia     Objective     Vital Signs  Temp:  [98.1 °F (36.7 °C)] 98.1 °F (36.7 °C)  Heart Rate:  [108] 108  Resp:  [16] 16  BP: (137)/(83) 137/83    Physical Exam:  Constitutional:   Alert, cooperative, in mild distress, appears stated age   Eyes:           Lids and lashes normal, conjunctivae and sclerae normal,   no icterus   Ears, nose, mouth and throat:  Normal appearance of external ears and nose, no oral l  lesions, no thrush, oral mucosa moist   Respiratory:    Clear to auscultation, respirations regular, even and             unlabored    Cardiovascular:   Regular rhythm and normal rate, normal S1 and S2, no        murmur, no gallop, palpable distal pulses, no lower extremity edema   Gastrointestinal:    Soft, nondistended, nontender to palpation, no guarding, no rebound tenderness, normal bowel sounds, no palpable masses or organomegaly  Rectal exam: deferred   Musculoskeletal:  Normal station, no atrophy, no tenderness to palpation, normal digits and nails   Skin:  Normal color, no bleeding, bruising, rashes or lesions   Lymphatics:  No palpable cervical or supraclavicular adenopathy   Psychiatric:  Judgement and insight: normal   Orientation to person, place and time: normal   Mood and affect: normal       Results Review:   I reviewed the patient's new clinical results.    Results from last 7 days   Lab Units 10/11/21  0731   WBC 10*3/mm3 6.05   HEMOGLOBIN g/dL 13.8   HEMATOCRIT % 40.1   PLATELETS 10*3/mm3 259       Results from last 7 days   Lab Units 10/11/21  0731   SODIUM mmol/L 143   POTASSIUM mmol/L 3.1*   CHLORIDE mmol/L 101   CO2 mmol/L 28.0   BUN mg/dL 10   CREATININE mg/dL 0.52*   CALCIUM mg/dL 9.0   BILIRUBIN mg/dL 0.5   ALK PHOS U/L 92   ALT (SGPT) U/L 14   AST (SGOT) U/L 19   GLUCOSE mg/dL 97             No results found for: LIPASE    Radiology:  Imaging Results (Last 72 Hours)     ** No results found for the last 72 hours. **          Assessment/Plan    Esophageal food bolus  Dysphagia    Impression  1.  Esophageal food bolus: Acute on chronic issue.    2.  Chronic intermittent dysphagia    3.  Tobacco abuse    4.  Daily alcohol use    Plan  Proceed with EGD in the OR for food bolus removal.  I discussed the procedure, risk of bleeding and perforation.  I discussed with her the need for a soft food diet and likely ongoing intervention following her procedure.  She verbalizes understanding and agrees to proceed    Will await Covid testing results which are pending.    I discussed the patients findings and my recommendations with patient    All necessary PPE, including face mask and eye protection, were worn during this encounter.  Hand sanitization was performed both before and after the patient interaction.    Connie Kumar MD  [facility]      Dictated utilizing Dragon dictation

## 2021-10-11 NOTE — ANESTHESIA POSTPROCEDURE EVALUATION
Patient: Rita Christopher    Procedure Summary     Date: 10/11/21 Room / Location: [facility] OR  / [facility]IN OR    Anesthesia Start: 1126 Anesthesia Stop: 1210    Procedure: ESOPHAGOGASTRODUODENOSCOPY with possible interventions   (N/A Esophagus) Diagnosis:       Esophageal obstruction due to food impaction      (Esophageal obstruction due to food impaction [K22.2, T18.128A])    Surgeons: Connie Kumar MD Provider: Carlos Lewis MD    Anesthesia Type: general ASA Status: 3 - Emergent          Anesthesia Type: general    Vitals  Vitals Value Taken Time   /79 10/11/21 1231   Temp 36.5 °C (97.7 °F) 10/11/21 1210   Pulse 76 10/11/21 1244   Resp 16 10/11/21 1240   SpO2 92 % 10/11/21 1244   Vitals shown include unvalidated device data.        Post Anesthesia Care and Evaluation    Patient location during evaluation: PACU  Patient participation: complete - patient participated  Level of consciousness: awake and alert  Pain management: adequate  Airway patency: patent  Anesthetic complications: No anesthetic complications    Cardiovascular status: acceptable  Respiratory status: acceptable  Hydration status: acceptable    Comments: --------------------            10/11/21               1300     --------------------   BP:       141/83     Pulse:      80       Resp:       16       Temp:                SpO2:      95%      --------------------

## 2021-10-11 NOTE — ANESTHESIA PROCEDURE NOTES
Airway  Urgency: elective    Date/Time: 10/11/2021 11:35 AM  Difficult airway    General Information and Staff    Patient location during procedure: OR  Anesthesiologist: Carlos Lewis MD    Indications and Patient Condition  Indications for airway management: airway protection    Preoxygenated: yes  Mask difficulty assessment: 1 - vent by mask    Final Airway Details  Final airway type: endotracheal airway      Successful airway: ETT  Cuffed: yes   Successful intubation technique: direct laryngoscopy and video laryngoscopy  Blade: CMAC  Blade size: D  ETT size (mm): 6.5  Cormack-Lehane Classification: grade III - view of epiglottis only  Placement verified by: chest auscultation and capnometry   Measured from: teeth  ETT/EBT  to teeth (cm): 21  Number of attempts at approach: 2  Assessment: lips, teeth, and gum same as pre-op and atraumatic intubation    Additional Comments  DL x1 with MAC 3, grade 3 view, small opening  DL x1 with CMAC D-blade, ett placed atraumatically

## 2021-10-11 NOTE — ED PROVIDER NOTES
EMERGENCY DEPARTMENT ENCOUNTER    Room Number:  [facility]/MAIN OR  Date of encounter:  10/11/2021  PCP: Patience Brown MD  Historian: Patient      I used full protective equipment while examining this patient.  This includes face mask, gloves and protective eyewear.  I washed my hands before entering the room and immediately upon leaving the room      HPI:  Chief Complaint: Difficulty swallowing  A complete HPI/ROS/PMH/PSH/SH/FH are unobtainable due to: Nothing    Context: Rita Christopher is a 60 y.o. female who presents to the ED c/o sudden onset of difficulty swallowing.  Patient states she was eating steak last night when she felt a piece of steak get stuck in her esophagus.  Since that time patient has been unable to swallow any further food, fluids.  She has been spitting up saliva all night.  She has not been able to make herself vomit.  She denies any chest pain, shortness of breath, difficulty speaking.  She states she has had milder issues in the past with food and pills not passing however never to this extent.  She denies any fevers, chills.    Review of Medical Records  I reviewed patient's last office visit with her family medicine doctor.  Patient seen on 9/13/2021 for hypertension, osteoporosis.    PAST MEDICAL HISTORY  Active Ambulatory Problems     Diagnosis Date Noted   • No Active Ambulatory Problems     Resolved Ambulatory Problems     Diagnosis Date Noted   • No Resolved Ambulatory Problems     Past Medical History:   Diagnosis Date   • Hypertension    • Osteoporosis          PAST SURGICAL HISTORY  Past Surgical History:   Procedure Laterality Date   • FEMUR FRACTURE SURGERY     • TUBAL ABDOMINAL LIGATION           FAMILY HISTORY  History reviewed. No pertinent family history.      SOCIAL HISTORY  Social History     Socioeconomic History   • Marital status: Single   Tobacco Use   • Smoking status: Current Every Day Smoker     Packs/day: 1.00   Substance and Sexual Activity   • Alcohol  use: Yes     Comment: 2-3 beers per day   • Drug use: Yes     Types: Marijuana   • Sexual activity: Defer         ALLERGIES  Lisinopril and Paroxetine        REVIEW OF SYSTEMS  All systems reviewed and negative except for those discussed in HPI.       PHYSICAL EXAM    I have reviewed the triage vital signs and nursing notes.    ED Triage Vitals [10/11/21 0709]   Temp Heart Rate Resp BP SpO2   98.1 °F (36.7 °C) 108 16 -- 95 %      Temp src Heart Rate Source Patient Position BP Location FiO2 (%)   Tympanic Monitor -- -- --       Physical Exam  GENERAL: Alert, oriented, not distressed  HENT: head atraumatic, no nuchal rigidity  EYES: no scleral icterus, EOMI  CV: regular rhythm, regular rate, no murmur  RESPIRATORY: normal effort, CTA, no stridor  ABDOMEN: soft, nontender  MUSCULOSKELETAL: no deformity, FROM, no calf swelling or tenderness  NEURO: alert, moves all extremities, follows commands  SKIN: warm, dry        LAB RESULTS  Recent Results (from the past 24 hour(s))   Comprehensive Metabolic Panel    Collection Time: 10/11/21  7:31 AM    Specimen: Blood   Result Value Ref Range    Glucose 97 65 - 99 mg/dL    BUN 10 6 - 20 mg/dL    Creatinine 0.52 (L) 0.57 - 1.00 mg/dL    Sodium 143 136 - 145 mmol/L    Potassium 3.1 (L) 3.5 - 5.2 mmol/L    Chloride 101 98 - 107 mmol/L    CO2 28.0 22.0 - 29.0 mmol/L    Calcium 9.0 8.6 - 10.5 mg/dL    Total Protein 7.6 6.0 - 8.5 g/dL    Albumin 4.90 3.50 - 5.20 g/dL    ALT (SGPT) 14 1 - 33 U/L    AST (SGOT) 19 1 - 32 U/L    Alkaline Phosphatase 92 39 - 117 U/L    Total Bilirubin 0.5 0.0 - 1.2 mg/dL    eGFR Non African Amer 121 >60 mL/min/1.73    Globulin 2.7 gm/dL    A/G Ratio 1.8 g/dL    BUN/Creatinine Ratio 19.2 7.0 - 25.0    Anion Gap 14.0 5.0 - 15.0 mmol/L   CBC Auto Differential    Collection Time: 10/11/21  7:31 AM    Specimen: Blood   Result Value Ref Range    WBC 6.05 3.40 - 10.80 10*3/mm3    RBC 4.36 3.77 - 5.28 10*6/mm3    Hemoglobin 13.8 12.0 - 15.9 g/dL    Hematocrit 40.1  34.0 - 46.6 %    MCV 92.0 79.0 - 97.0 fL    MCH 31.7 26.6 - 33.0 pg    MCHC 34.4 31.5 - 35.7 g/dL    RDW 12.0 (L) 12.3 - 15.4 %    RDW-SD 40.1 37.0 - 54.0 fl    MPV 9.2 6.0 - 12.0 fL    Platelets 259 140 - 450 10*3/mm3    Neutrophil % 60.4 42.7 - 76.0 %    Lymphocyte % 30.6 19.6 - 45.3 %    Monocyte % 7.6 5.0 - 12.0 %    Eosinophil % 0.8 0.3 - 6.2 %    Basophil % 0.3 0.0 - 1.5 %    Immature Grans % 0.3 0.0 - 0.5 %    Neutrophils, Absolute 3.65 1.70 - 7.00 10*3/mm3    Lymphocytes, Absolute 1.85 0.70 - 3.10 10*3/mm3    Monocytes, Absolute 0.46 0.10 - 0.90 10*3/mm3    Eosinophils, Absolute 0.05 0.00 - 0.40 10*3/mm3    Basophils, Absolute 0.02 0.00 - 0.20 10*3/mm3    Immature Grans, Absolute 0.02 0.00 - 0.05 10*3/mm3    nRBC 0.0 0.0 - 0.2 /100 WBC   COVID-19,[facility] IN-HOUSE CEPHEID/MIGUEL NP SWAB IN TRANSPORT MEDIA 8-12 HR TAT - Swab, Nasopharynx    Collection Time: 10/11/21  7:39 AM    Specimen: Nasopharynx; Swab   Result Value Ref Range    COVID19 Not Detected Not Detected - Ref. Range       Ordered the above labs and independently reviewed the results.        MEDICATIONS GIVEN IN ER    Medications   sodium chloride 0.9 % flush 10 mL ( Intravenous MAR Hold 10/11/21 1029)   lactated ringers infusion (9 mL/hr Intravenous New Bag 10/11/21 1111)   lactated ringers infusion ( Intravenous Anesthesia Volume Adjustment 10/11/21 1159)   HYDROcodone-acetaminophen (NORCO) 7.5-325 MG per tablet 1 tablet (has no administration in time range)   HYDROmorphone (DILAUDID) injection 0.25 mg (has no administration in time range)   oxyCODONE-acetaminophen (PERCOCET)  MG per tablet 1 tablet (has no administration in time range)   fentaNYL citrate (PF) (SUBLIMAZE) injection 50 mcg (has no administration in time range)   naloxone (NARCAN) injection 0.2 mg (has no administration in time range)   flumazenil (ROMAZICON) injection 0.2 mg (has no administration in time range)   ondansetron (ZOFRAN) injection 4 mg (has no administration in  time range)   promethazine (PHENERGAN) suppository 25 mg (has no administration in time range)     Or   promethazine (PHENERGAN) tablet 25 mg (has no administration in time range)   labetalol (NORMODYNE,TRANDATE) injection 5 mg (has no administration in time range)   hydrALAZINE (APRESOLINE) injection 5 mg (has no administration in time range)   ePHEDrine injection 5 mg (has no administration in time range)   diphenhydrAMINE (BENADRYL) injection 12.5 mg (has no administration in time range)   diphenhydrAMINE (BENADRYL) capsule 25 mg (has no administration in time range)   LORazepam (ATIVAN) injection 1 mg (1 mg Intravenous Given 10/11/21 0737)   famotidine (PEPCID) injection 20 mg (20 mg Intravenous Given 10/11/21 1057)         PROGRESS, DATA ANALYSIS, CONSULTS, AND MEDICAL DECISION MAKING    All labs have been independently reviewed by me.  All radiology studies have been reviewed by me and discussed with radiologist dictating the report.   EKG's independently viewed and interpreted by me.  Discussion below represents my analysis of pertinent findings related to patient's condition, differential diagnosis, treatment plan and final disposition.    I have discussed case with Dr. Duffy, emergency room physician.  He has performed his own bedside examination and agrees with treatment plan.    ED Course as of 10/11/21 1421   Mon Oct 11, 2021   0726 Patient presents with likely food bolus after eating steak last night.  Patient has stable vitals, no shortness of breath no stridor.  Will discuss with gastroenterology. [EE]   0743 I discussed case with Dr. Ayala, gastroenterology.  He will ensure patient is taken for EGD. [EE]   0754 WBC: 6.05 [EE]   0754 Hemoglobin: 13.8 [EE]      ED Course User Index  [EE] Mukund Vasquez PA       AS OF 14:21 EDT VITALS:    BP - 141/83  HR - 80  TEMP - 97.7 °F (36.5 °C) (Oral)  O2 SATS - 91%        DIAGNOSIS  Final diagnoses:   Esophageal obstruction due to food impaction          DISPOSITION  Sent to OR for EGD           Mukund Vasquez PA  10/11/21 6677

## 2021-10-11 NOTE — ED PROVIDER NOTES
MD ATTESTATION NOTE  Patient was placed in face mask in first look and the following protective measures were taken unless documented below in the ED course. Patient was wearing facemask when I entered the room and throughout our encounter. I wore full protective equipment throughout this patient encounter including a N95 face mask, googles, gown and gloves. Hand hygiene was performed before donning protective equipment and after removal when leaving the room.    The [facility] and I have discussed this patient's history, physical exam, and treatment plan. I have reviewed the documentation and personally had a face to face interaction with the patient. I affirm the [facility]ICK documentation and agree with their diagnostics, findings, treatment, plan, and disposition.  The attached note describes my personal findings.    Rita Christopher is a 60 y.o. female who presents to the ED c/o steak stuck in her throat.  Patient reports that she was eating steak last night for dinner when she felt a piece get stuck in her throat.  Patient reports that she attempted the Heimlich as well as attempted to vomit without success.  Patient reports that since that time whenever she attempts to drink something she immediately vomits back up the liquid, same as what she drank, denies any hematemesis.  Patient reports that she has had food stuck in her throat in the past but has been able to relieve it herself, denies any previous GI evaluations, no prior EGD.  Patient denies any history of acid reflux, not on reflux medications.  Patient denies any throat pain, no difficulty breathing, no voice changes.  Patient reports that prior to the incident she was at baseline without complaint.    On exam:  General: NAD.  Head: NCAT.  ENT: EOMI, PERRLA, MMM.  Oropharynx is clear, voice is normal, no difficulty handling secretions  Neck: Supple, trachea midline.  No subcutaneous emphysema or chest wall crepitus.  Cardiac: regular rate and rhythm.  Lungs:  CTAB.  Abdomen: Soft, NTTP, no rebound tenderness/guarding/rigidity.   : Deferred to [facility].  Extremities: Moves all extremities well, no peripheral edema  Skin: Warm, dry.    Medical Decision Making:  After the initial H&P, I discussed pertinent information from history and physical exam with patient/family.  Discussed differential diagnosis.  Discussed plan for ED evaluation/work-up/treatment.  All questions answered.  Patient/family is agreeable with plan.    ED Course as of 10/11/21 1508   Mon Oct 11, 2021   6787 Patient presents with likely food bolus after eating steak last night.  Patient has stable vitals, no shortness of breath no stridor.  Will discuss with gastroenterology. [EE]   0743 I discussed case with Dr. Ayala, gastroenterology.  He will ensure patient is taken for EGD. [EE]   0754 WBC: 6.05 [EE]   0754 Hemoglobin: 13.8 [EE]      ED Course User Index  [EE] Mukund Vasquez, PA       Diagnosis  Final diagnoses:   Esophageal obstruction due to food impaction        Preston Duffy MD  10/11/21 1500

## 2021-10-14 ENCOUNTER — TELEPHONE (OUTPATIENT)
Dept: GASTROENTEROLOGY | Facility: CLINIC | Age: 61
End: 2021-10-14

## 2021-10-28 ENCOUNTER — TELEPHONE (OUTPATIENT)
Dept: GASTROENTEROLOGY | Facility: CLINIC | Age: 61
End: 2021-10-28

## 2021-10-28 ENCOUNTER — OFFICE VISIT (OUTPATIENT)
Dept: GASTROENTEROLOGY | Facility: CLINIC | Age: 61
End: 2021-10-28

## 2021-10-28 VITALS — WEIGHT: 115 LBS | BODY MASS INDEX: 21.16 KG/M2 | TEMPERATURE: 97.8 F | HEIGHT: 62 IN

## 2021-10-28 DIAGNOSIS — K22.2 BENIGN ESOPHAGEAL STRICTURE: Primary | ICD-10-CM

## 2021-10-28 DIAGNOSIS — K29.70 GASTRITIS WITHOUT BLEEDING, UNSPECIFIED CHRONICITY, UNSPECIFIED GASTRITIS TYPE: ICD-10-CM

## 2021-10-28 PROCEDURE — 99213 OFFICE O/P EST LOW 20 MIN: CPT | Performed by: PHYSICIAN ASSISTANT

## 2021-10-28 RX ORDER — AMLODIPINE BESYLATE 10 MG/1
10 TABLET ORAL
COMMUNITY
Start: 2021-09-13

## 2021-10-28 RX ORDER — PANTOPRAZOLE SODIUM 20 MG/1
20 TABLET, DELAYED RELEASE ORAL
Qty: 30 TABLET | Refills: 5 | Status: SHIPPED | OUTPATIENT
Start: 2021-10-28 | End: 2021-11-01 | Stop reason: HOSPADM

## 2021-10-28 NOTE — TELEPHONE ENCOUNTER
----- Message from Mignon Harding PA-C sent at 10/28/2021 10:48 AM EDT -----  Can you call PCP (Dr. Patience Brown) and see when her last Colonoscopy was? Possibly Patience Hart PA-C (previous PCP) might know. I found nothing on Epic, care anywhere, media.

## 2021-10-28 NOTE — TELEPHONE ENCOUNTER
Called pt's pcp Dr Brown at [phone] and spoke with Patience and she advised she can see her last c/s was done in 2015 but their system will not allow them to see where it was done.     Attempted to call pt and number can not be completed.       Called [facility] at [phone] and they have no record .    Checked in [facility] and no records found. Update sent to Mignon SAWANT.

## 2021-10-28 NOTE — PROGRESS NOTES
"Chief Complaint  Hospital Follow Up Visit    Subjective          History of Present Illness    Rita Christopher is a  60 y.o. female presents for hospital follow-up.  She was consulted in the ED by Dr. Kumar on 10/11/21 for esophageal food bolus.    She underwent EGD for food removal in the esophagus with Dr. Kumar on date above.  She was found to have gastritis and a benign-appearing esophageal stenosis.  She was sent home on pantoprazole 20 mg daily.  Recommendation was for EGD follow-up after the inflammation was improved as she likely needs dilation.    She reports intermittent issues prior to this with trouble swallowing, mostly with pills every once in awhile. Prior to this incident, no trouble with food or liquids.     She has never had an EGD evaluation.  She denies a family history of any GI malignancies though reports that her mother did have issues with reflux and a hiatal hernia.    EGD with Dr. Kumar on 10/11/2021 showed Food in the upper third of the esophagus. Removal was successful.  - Benign-appearing esophageal stenosis.  - Z-line, 37 cm from the incisors.  - Erythematous mucosa in the gastric body and antrum.  - Normal examined duodenum.     She smokes 1 pack/day.  She drinks 2-3 beers per day.    Objective   Vital Signs:   Temp 97.8 °F (36.6 °C)   Ht 157.5 cm (62\")   Wt 52.2 kg (115 lb)   BMI 21.03 kg/m²       Physical Exam  Vitals reviewed.   Constitutional:       General: She is awake. She is not in acute distress.     Appearance: Normal appearance. She is well-developed and well-groomed.   HENT:      Head: Normocephalic and atraumatic.   Pulmonary:      Effort: Pulmonary effort is normal. No respiratory distress.   Skin:     Coloration: Skin is not pale.   Neurological:      Mental Status: She is alert and oriented to person, place, and time.      Gait: Gait normal.   Psychiatric:         Mood and Affect: Mood and affect normal.         Speech: Speech normal.         Behavior: Behavior " is cooperative.         Judgment: Judgment normal.          Result Review :             Assessment and Plan    Diagnoses and all orders for this visit:    1. Benign esophageal stricture (Primary)    2. Gastritis without bleeding, unspecified chronicity, unspecified gastritis type    Other orders  -     pantoprazole (PROTONIX) 20 MG EC tablet; Take 1 tablet by mouth Every Morning Before Breakfast.  Dispense: 30 tablet; Refill: 5    She will need EGD in about 12 weeks with possible dilation.  She will continue pantoprazole 20 mg daily.    Our office called her PCP, Dr. Brown regarding last Colonoscopy date which was in 2015.  However she does not have the results.  Patient believes they were normal without any evidence of colon polyps.  She does not believe she is a history of colon polyps.  We will plan for repeat colonoscopy in 2025 as she is at average risk.    Follow Up   Return in about 4 months (around 2/28/2022).    Dragon dictation used throughout this note.     Mignon Harding PA-C

## 2021-10-29 NOTE — TELEPHONE ENCOUNTER
Thank you for calling and find this out for me.  I updated her chart.  We will plan for EGD only in about 12 weeks.  I put an order.  Can you please let patient know that we will do EGD only.  Informed this to Vani to schedule with Dr. Kumar for EGD in about 12 weeks. Per [facility].     Called pt and on ( per [facility])  vm advised of the above. Advised to call with any questions.    Message sent to Vani in scheduling.

## 2021-10-30 ENCOUNTER — HOSPITAL ENCOUNTER (OUTPATIENT)
Facility: HOSPITAL | Age: 61
Setting detail: OBSERVATION
Discharge: HOME | End: 2021-11-01
Attending: EMERGENCY MEDICINE | Admitting: STUDENT IN AN ORGANIZED HEALTH CARE EDUCATION/TRAINING PROGRAM

## 2021-10-30 DIAGNOSIS — K22.2 ESOPHAGEAL OBSTRUCTION DUE TO FOOD IMPACTION: Primary | ICD-10-CM

## 2021-10-30 DIAGNOSIS — T18.128A ESOPHAGEAL OBSTRUCTION DUE TO FOOD IMPACTION: Primary | ICD-10-CM

## 2021-10-30 DIAGNOSIS — K29.70 GASTRITIS WITHOUT BLEEDING, UNSPECIFIED CHRONICITY, UNSPECIFIED GASTRITIS TYPE: ICD-10-CM

## 2021-10-30 DIAGNOSIS — K22.2 BENIGN ESOPHAGEAL STRICTURE: ICD-10-CM

## 2021-10-30 PROBLEM — K56.699 FOOD BOLUS OBSTRUCTION OF INTESTINE (HCC): Status: ACTIVE | Noted: 2021-10-30
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
## 2021-10-30 PROCEDURE — U0003 INFECTIOUS AGENT DETECTION BY NUCLEIC ACID (DNA OR RNA); SEVERE ACUTE RESPIRATORY SYNDROME CORONAVIRUS 2 (SARS-COV-2) (CORONAVIRUS DISEASE [COVID-19]), AMPLIFIED PROBE TECHNIQUE, MAKING USE OF HIGH THROUGHPUT TECHNOLOGIES AS DESCRIBED BY CMS-2020-01-R: HCPCS | Performed by: EMERGENCY MEDICINE

## 2021-10-30 PROCEDURE — C9803 HOPD COVID-19 SPEC COLLECT: HCPCS

## 2021-10-30 PROCEDURE — 99284 EMERGENCY DEPT VISIT MOD MDM: CPT

## 2021-10-30 PROCEDURE — G0378 HOSPITAL OBSERVATION PER HR: HCPCS

## 2021-10-30 RX ORDER — SODIUM CHLORIDE 0.9 % (FLUSH) 0.9 %
10 SYRINGE (ML) INJECTION AS NEEDED
Status: DISCONTINUED | OUTPATIENT
Start: 2021-10-30 | End: 2021-11-01 | Stop reason: HOSPADM

## 2021-10-31 ENCOUNTER — APPOINTMENT (OUTPATIENT)
Dept: GENERAL RADIOLOGY | Facility: HOSPITAL | Age: 61
End: 2021-10-31

## 2021-10-31 ENCOUNTER — ANESTHESIA (OUTPATIENT)
Dept: PERIOP | Facility: HOSPITAL | Age: 61
End: 2021-10-31

## 2021-10-31 ENCOUNTER — ANESTHESIA EVENT (OUTPATIENT)
Dept: PERIOP | Facility: HOSPITAL | Age: 61
End: 2021-10-31

## 2021-10-31 PROBLEM — M81.0 OSTEOPOROSIS: Status: ACTIVE | Noted: 2021-10-31

## 2021-10-31 PROBLEM — E87.6 HYPOKALEMIA: Status: ACTIVE | Noted: 2021-10-31

## 2021-10-31 PROBLEM — I10 HTN (HYPERTENSION): Status: ACTIVE | Noted: 2021-10-31

## 2021-10-31 LAB
ALBUMIN SERPL-MCNC: 4 G/DL (ref 3.5–5.2)
ALBUMIN/GLOB SERPL: 1.6 G/DL
ALP SERPL-CCNC: 105 U/L (ref 39–117)
ALT SERPL W P-5'-P-CCNC: 14 U/L (ref 1–33)
ANION GAP SERPL CALCULATED.3IONS-SCNC: 13 MMOL/L (ref 5–15)
AST SERPL-CCNC: 22 U/L (ref 1–32)
BASOPHILS # BLD AUTO: 0.02 10*3/MM3 (ref 0–0.2)
BASOPHILS NFR BLD AUTO: 0.5 % (ref 0–1.5)
BILIRUB SERPL-MCNC: 0.2 MG/DL (ref 0–1.2)
BUN SERPL-MCNC: 8 MG/DL (ref 6–20)
BUN/CREAT SERPL: 18.6 (ref 7–25)
CALCIUM SPEC-SCNC: 8.2 MG/DL (ref 8.6–10.5)
CHLORIDE SERPL-SCNC: 103 MMOL/L (ref 98–107)
CO2 SERPL-SCNC: 28 MMOL/L (ref 22–29)
CREAT SERPL-MCNC: 0.43 MG/DL (ref 0.57–1)
DEPRECATED RDW RBC AUTO: 40.6 FL (ref 37–54)
EOSINOPHIL # BLD AUTO: 0.06 10*3/MM3 (ref 0–0.4)
EOSINOPHIL NFR BLD AUTO: 1.5 % (ref 0.3–6.2)
ERYTHROCYTE [DISTWIDTH] IN BLOOD BY AUTOMATED COUNT: 12.4 % (ref 12.3–15.4)
GFR SERPL CREATININE-BSD FRML MDRD: >150 ML/MIN/1.73
GLOBULIN UR ELPH-MCNC: 2.5 GM/DL
GLUCOSE SERPL-MCNC: 87 MG/DL (ref 65–99)
HCT VFR BLD AUTO: 37.6 % (ref 34–46.6)
HGB BLD-MCNC: 13.1 G/DL (ref 12–15.9)
IMM GRANULOCYTES # BLD AUTO: 0.01 10*3/MM3 (ref 0–0.05)
IMM GRANULOCYTES NFR BLD AUTO: 0.3 % (ref 0–0.5)
LYMPHOCYTES # BLD AUTO: 1.65 10*3/MM3 (ref 0.7–3.1)
LYMPHOCYTES NFR BLD AUTO: 41.9 % (ref 19.6–45.3)
MAGNESIUM SERPL-MCNC: 2.2 MG/DL (ref 1.6–2.6)
MCH RBC QN AUTO: 31.8 PG (ref 26.6–33)
MCHC RBC AUTO-ENTMCNC: 34.8 G/DL (ref 31.5–35.7)
MCV RBC AUTO: 91.3 FL (ref 79–97)
MONOCYTES # BLD AUTO: 0.26 10*3/MM3 (ref 0.1–0.9)
MONOCYTES NFR BLD AUTO: 6.6 % (ref 5–12)
NEUTROPHILS NFR BLD AUTO: 1.94 10*3/MM3 (ref 1.7–7)
NEUTROPHILS NFR BLD AUTO: 49.2 % (ref 42.7–76)
NRBC BLD AUTO-RTO: 0 /100 WBC (ref 0–0.2)
PLATELET # BLD AUTO: 278 10*3/MM3 (ref 140–450)
PMV BLD AUTO: 9.4 FL (ref 6–12)
POTASSIUM SERPL-SCNC: 3.1 MMOL/L (ref 3.5–5.2)
PROT SERPL-MCNC: 6.5 G/DL (ref 6–8.5)
RBC # BLD AUTO: 4.12 10*6/MM3 (ref 3.77–5.28)
SARS-COV-2 RNA RESP QL NAA+PROBE: NOT DETECTED
SODIUM SERPL-SCNC: 144 MMOL/L (ref 136–145)
WBC # BLD AUTO: 3.94 10*3/MM3 (ref 3.4–10.8)

## 2021-10-31 PROCEDURE — G0378 HOSPITAL OBSERVATION PER HR: HCPCS

## 2021-10-31 PROCEDURE — 74220 X-RAY XM ESOPHAGUS 1CNTRST: CPT

## 2021-10-31 PROCEDURE — 0 DIATRIZOATE MEGLUMINE & SODIUM PER 1 ML: Performed by: STUDENT IN AN ORGANIZED HEALTH CARE EDUCATION/TRAINING PROGRAM

## 2021-10-31 PROCEDURE — 25010000002 HYDROMORPHONE PER 4 MG: Performed by: ANESTHESIOLOGY

## 2021-10-31 PROCEDURE — 25010000002 SUCCINYLCHOLINE PER 20 MG: Performed by: ANESTHESIOLOGY

## 2021-10-31 PROCEDURE — 83735 ASSAY OF MAGNESIUM: CPT | Performed by: NURSE PRACTITIONER

## 2021-10-31 PROCEDURE — 43247 EGD REMOVE FOREIGN BODY: CPT | Performed by: INTERNAL MEDICINE

## 2021-10-31 PROCEDURE — 25010000002 FENTANYL CITRATE (PF) 50 MCG/ML SOLUTION: Performed by: ANESTHESIOLOGY

## 2021-10-31 PROCEDURE — 25010000002 PROPOFOL 10 MG/ML EMULSION: Performed by: ANESTHESIOLOGY

## 2021-10-31 PROCEDURE — 0 AMPICILLIN-SULBACTAM PER 1.5 G: Performed by: THORACIC SURGERY (CARDIOTHORACIC VASCULAR SURGERY)

## 2021-10-31 PROCEDURE — 85025 COMPLETE CBC W/AUTO DIFF WBC: CPT | Performed by: PHYSICIAN ASSISTANT

## 2021-10-31 PROCEDURE — 25010000002 MIDAZOLAM PER 1 MG: Performed by: ANESTHESIOLOGY

## 2021-10-31 PROCEDURE — 80053 COMPREHEN METABOLIC PANEL: CPT | Performed by: PHYSICIAN ASSISTANT

## 2021-10-31 RX ORDER — HYDROMORPHONE HYDROCHLORIDE 1 MG/ML
0.25 INJECTION, SOLUTION INTRAMUSCULAR; INTRAVENOUS; SUBCUTANEOUS
Status: DISCONTINUED | OUTPATIENT
Start: 2021-10-31 | End: 2021-10-31 | Stop reason: HOSPADM

## 2021-10-31 RX ORDER — POTASSIUM CHLORIDE 7.45 MG/ML
10 INJECTION INTRAVENOUS
Status: DISCONTINUED | OUTPATIENT
Start: 2021-10-31 | End: 2021-11-01 | Stop reason: HOSPADM

## 2021-10-31 RX ORDER — FAMOTIDINE 10 MG/ML
20 INJECTION, SOLUTION INTRAVENOUS ONCE
Status: COMPLETED | OUTPATIENT
Start: 2021-10-31 | End: 2021-10-31

## 2021-10-31 RX ORDER — OXYCODONE AND ACETAMINOPHEN 7.5; 325 MG/1; MG/1
1 TABLET ORAL ONCE AS NEEDED
Status: DISCONTINUED | OUTPATIENT
Start: 2021-10-31 | End: 2021-10-31 | Stop reason: HOSPADM

## 2021-10-31 RX ORDER — SUCCINYLCHOLINE CHLORIDE 20 MG/ML
INJECTION INTRAMUSCULAR; INTRAVENOUS AS NEEDED
Status: DISCONTINUED | OUTPATIENT
Start: 2021-10-31 | End: 2021-10-31 | Stop reason: SURG

## 2021-10-31 RX ORDER — ONDANSETRON 2 MG/ML
4 INJECTION INTRAMUSCULAR; INTRAVENOUS ONCE AS NEEDED
Status: DISCONTINUED | OUTPATIENT
Start: 2021-10-31 | End: 2021-10-31 | Stop reason: HOSPADM

## 2021-10-31 RX ORDER — PANTOPRAZOLE SODIUM 40 MG/10ML
40 INJECTION, POWDER, LYOPHILIZED, FOR SOLUTION INTRAVENOUS
Status: DISCONTINUED | OUTPATIENT
Start: 2021-11-01 | End: 2021-11-01 | Stop reason: HOSPADM

## 2021-10-31 RX ORDER — SODIUM CHLORIDE 0.9 % (FLUSH) 0.9 %
10 SYRINGE (ML) INJECTION EVERY 12 HOURS SCHEDULED
Status: DISCONTINUED | OUTPATIENT
Start: 2021-10-31 | End: 2021-11-01 | Stop reason: HOSPADM

## 2021-10-31 RX ORDER — NITROGLYCERIN 0.4 MG/1
0.4 TABLET SUBLINGUAL
Status: DISCONTINUED | OUTPATIENT
Start: 2021-10-31 | End: 2021-11-01 | Stop reason: HOSPADM

## 2021-10-31 RX ORDER — HYDROCODONE BITARTRATE AND ACETAMINOPHEN 5; 325 MG/1; MG/1
1 TABLET ORAL ONCE AS NEEDED
Status: DISCONTINUED | OUTPATIENT
Start: 2021-10-31 | End: 2021-10-31 | Stop reason: HOSPADM

## 2021-10-31 RX ORDER — DROPERIDOL 2.5 MG/ML
0.62 INJECTION, SOLUTION INTRAMUSCULAR; INTRAVENOUS ONCE AS NEEDED
Status: DISCONTINUED | OUTPATIENT
Start: 2021-10-31 | End: 2021-10-31 | Stop reason: HOSPADM

## 2021-10-31 RX ORDER — POTASSIUM CHLORIDE 750 MG/1
40 TABLET, FILM COATED, EXTENDED RELEASE ORAL AS NEEDED
Status: DISCONTINUED | OUTPATIENT
Start: 2021-10-31 | End: 2021-11-01 | Stop reason: HOSPADM

## 2021-10-31 RX ORDER — MAGNESIUM SULFATE HEPTAHYDRATE 40 MG/ML
2 INJECTION, SOLUTION INTRAVENOUS AS NEEDED
Status: DISCONTINUED | OUTPATIENT
Start: 2021-10-31 | End: 2021-11-01 | Stop reason: HOSPADM

## 2021-10-31 RX ORDER — PROPOFOL 10 MG/ML
VIAL (ML) INTRAVENOUS AS NEEDED
Status: DISCONTINUED | OUTPATIENT
Start: 2021-10-31 | End: 2021-10-31 | Stop reason: SURG

## 2021-10-31 RX ORDER — FENTANYL CITRATE 50 UG/ML
50 INJECTION, SOLUTION INTRAMUSCULAR; INTRAVENOUS
Status: DISCONTINUED | OUTPATIENT
Start: 2021-10-31 | End: 2021-10-31 | Stop reason: HOSPADM

## 2021-10-31 RX ORDER — GLYCOPYRROLATE 0.2 MG/ML
INJECTION INTRAMUSCULAR; INTRAVENOUS AS NEEDED
Status: DISCONTINUED | OUTPATIENT
Start: 2021-10-31 | End: 2021-10-31 | Stop reason: SURG

## 2021-10-31 RX ORDER — MIDAZOLAM HYDROCHLORIDE 1 MG/ML
1 INJECTION INTRAMUSCULAR; INTRAVENOUS
Status: DISCONTINUED | OUTPATIENT
Start: 2021-10-31 | End: 2021-10-31 | Stop reason: HOSPADM

## 2021-10-31 RX ORDER — DIPHENHYDRAMINE HYDROCHLORIDE 50 MG/ML
12.5 INJECTION INTRAMUSCULAR; INTRAVENOUS
Status: DISCONTINUED | OUTPATIENT
Start: 2021-10-31 | End: 2021-10-31 | Stop reason: HOSPADM

## 2021-10-31 RX ORDER — MAGNESIUM SULFATE HEPTAHYDRATE 40 MG/ML
4 INJECTION, SOLUTION INTRAVENOUS AS NEEDED
Status: DISCONTINUED | OUTPATIENT
Start: 2021-10-31 | End: 2021-11-01 | Stop reason: HOSPADM

## 2021-10-31 RX ORDER — FENTANYL CITRATE 50 UG/ML
INJECTION, SOLUTION INTRAMUSCULAR; INTRAVENOUS AS NEEDED
Status: DISCONTINUED | OUTPATIENT
Start: 2021-10-31 | End: 2021-10-31 | Stop reason: SURG

## 2021-10-31 RX ORDER — SODIUM CHLORIDE 0.9 % (FLUSH) 0.9 %
10 SYRINGE (ML) INJECTION EVERY 12 HOURS SCHEDULED
Status: DISCONTINUED | OUTPATIENT
Start: 2021-10-31 | End: 2021-10-31 | Stop reason: HOSPADM

## 2021-10-31 RX ORDER — POTASSIUM CHLORIDE 1.5 G/1.77G
40 POWDER, FOR SOLUTION ORAL AS NEEDED
Status: DISCONTINUED | OUTPATIENT
Start: 2021-10-31 | End: 2021-11-01 | Stop reason: HOSPADM

## 2021-10-31 RX ORDER — SODIUM CHLORIDE, SODIUM LACTATE, POTASSIUM CHLORIDE, CALCIUM CHLORIDE 600; 310; 30; 20 MG/100ML; MG/100ML; MG/100ML; MG/100ML
9 INJECTION, SOLUTION INTRAVENOUS CONTINUOUS
Status: DISCONTINUED | OUTPATIENT
Start: 2021-10-31 | End: 2021-10-31

## 2021-10-31 RX ORDER — NICOTINE 21 MG/24HR
1 PATCH, TRANSDERMAL 24 HOURS TRANSDERMAL
Status: DISCONTINUED | OUTPATIENT
Start: 2021-10-31 | End: 2021-11-01 | Stop reason: HOSPADM

## 2021-10-31 RX ORDER — SODIUM CHLORIDE 0.9 % (FLUSH) 0.9 %
10 SYRINGE (ML) INJECTION AS NEEDED
Status: DISCONTINUED | OUTPATIENT
Start: 2021-10-31 | End: 2021-10-31 | Stop reason: HOSPADM

## 2021-10-31 RX ORDER — ONDANSETRON 2 MG/ML
4 INJECTION INTRAMUSCULAR; INTRAVENOUS EVERY 6 HOURS PRN
Status: DISCONTINUED | OUTPATIENT
Start: 2021-10-31 | End: 2021-11-01 | Stop reason: HOSPADM

## 2021-10-31 RX ORDER — SODIUM CHLORIDE, SODIUM LACTATE, POTASSIUM CHLORIDE, CALCIUM CHLORIDE 600; 310; 30; 20 MG/100ML; MG/100ML; MG/100ML; MG/100ML
100 INJECTION, SOLUTION INTRAVENOUS CONTINUOUS
Status: DISCONTINUED | OUTPATIENT
Start: 2021-10-31 | End: 2021-10-31

## 2021-10-31 RX ORDER — NALOXONE HCL 0.4 MG/ML
0.4 VIAL (ML) INJECTION AS NEEDED
Status: DISCONTINUED | OUTPATIENT
Start: 2021-10-31 | End: 2021-10-31 | Stop reason: HOSPADM

## 2021-10-31 RX ORDER — PROPOFOL 10 MG/ML
VIAL (ML) INTRAVENOUS CONTINUOUS PRN
Status: DISCONTINUED | OUTPATIENT
Start: 2021-10-31 | End: 2021-10-31 | Stop reason: SURG

## 2021-10-31 RX ORDER — SODIUM CHLORIDE 0.9 % (FLUSH) 0.9 %
10 SYRINGE (ML) INJECTION AS NEEDED
Status: DISCONTINUED | OUTPATIENT
Start: 2021-10-31 | End: 2021-11-01 | Stop reason: HOSPADM

## 2021-10-31 RX ADMIN — GLYCOPYRROLATE 0.1 MG: 0.2 INJECTION INTRAMUSCULAR; INTRAVENOUS at 10:53

## 2021-10-31 RX ADMIN — SODIUM CHLORIDE, POTASSIUM CHLORIDE, SODIUM LACTATE AND CALCIUM CHLORIDE 9 ML/HR: 600; 310; 30; 20 INJECTION, SOLUTION INTRAVENOUS at 10:30

## 2021-10-31 RX ADMIN — MIDAZOLAM 1 MG: 1 INJECTION INTRAMUSCULAR; INTRAVENOUS at 11:16

## 2021-10-31 RX ADMIN — DIATRIZOATE MEGLUMINE AND DIATRIZOATE SODIUM 120 ML: 660; 100 LIQUID ORAL; RECTAL at 15:15

## 2021-10-31 RX ADMIN — FENTANYL CITRATE 25 MCG: 0.05 INJECTION, SOLUTION INTRAMUSCULAR; INTRAVENOUS at 11:38

## 2021-10-31 RX ADMIN — SODIUM CHLORIDE, PRESERVATIVE FREE 10 ML: 5 INJECTION INTRAVENOUS at 00:26

## 2021-10-31 RX ADMIN — POTASSIUM CHLORIDE 40 MEQ: 1.5 POWDER, FOR SOLUTION ORAL at 21:49

## 2021-10-31 RX ADMIN — POTASSIUM CHLORIDE 40 MEQ: 1.5 POWDER, FOR SOLUTION ORAL at 16:25

## 2021-10-31 RX ADMIN — Medication 1 PATCH: at 02:38

## 2021-10-31 RX ADMIN — SUCCINYLCHOLINE CHLORIDE 100 MG: 20 INJECTION, SOLUTION INTRAMUSCULAR; INTRAVENOUS; PARENTERAL at 11:36

## 2021-10-31 RX ADMIN — FENTANYL CITRATE 25 MCG: 0.05 INJECTION, SOLUTION INTRAMUSCULAR; INTRAVENOUS at 11:35

## 2021-10-31 RX ADMIN — SODIUM CHLORIDE 3 G: 9 INJECTION, SOLUTION INTRAVENOUS at 16:14

## 2021-10-31 RX ADMIN — SODIUM CHLORIDE 3 G: 9 INJECTION, SOLUTION INTRAVENOUS at 21:49

## 2021-10-31 RX ADMIN — SODIUM CHLORIDE, POTASSIUM CHLORIDE, SODIUM LACTATE AND CALCIUM CHLORIDE 100 ML/HR: 600; 310; 30; 20 INJECTION, SOLUTION INTRAVENOUS at 00:26

## 2021-10-31 RX ADMIN — FENTANYL CITRATE 25 MCG: 0.05 INJECTION, SOLUTION INTRAMUSCULAR; INTRAVENOUS at 11:42

## 2021-10-31 RX ADMIN — HYDROMORPHONE HYDROCHLORIDE 0.25 MG: 1 INJECTION, SOLUTION INTRAMUSCULAR; INTRAVENOUS; SUBCUTANEOUS at 12:33

## 2021-10-31 RX ADMIN — PROPOFOL 100 MG: 10 INJECTION, EMULSION INTRAVENOUS at 11:33

## 2021-10-31 RX ADMIN — FENTANYL CITRATE 25 MCG: 0.05 INJECTION, SOLUTION INTRAMUSCULAR; INTRAVENOUS at 11:33

## 2021-10-31 RX ADMIN — HYDROMORPHONE HYDROCHLORIDE 0.25 MG: 1 INJECTION, SOLUTION INTRAMUSCULAR; INTRAVENOUS; SUBCUTANEOUS at 12:23

## 2021-10-31 RX ADMIN — Medication 140 MCG/KG/MIN: at 11:33

## 2021-10-31 RX ADMIN — FAMOTIDINE 20 MG: 10 INJECTION INTRAVENOUS at 11:16

## 2021-10-31 RX ADMIN — FENTANYL CITRATE 50 MCG: 50 INJECTION INTRAMUSCULAR; INTRAVENOUS at 12:18

## 2021-10-31 NOTE — ANESTHESIA PREPROCEDURE EVALUATION
Anesthesia Evaluation     Patient summary reviewed and Nursing notes reviewed   no history of anesthetic complications:  NPO Solid Status: > 6 hours  NPO Liquid Status: > 6 hours           Airway   Mallampati: II  TM distance: >3 FB  Neck ROM: full  no difficulty expected and No difficulty expected  Dental - normal exam     Pulmonary - normal exam    breath sounds clear to auscultation  (+) a smoker Current,   (-) rhonchi, decreased breath sounds, wheezes, rales, stridor  Cardiovascular - normal exam    NYHA Classification: I  Rhythm: regular  Rate: normal    (+) hypertension,   (-) murmur, weak pulses, friction rub, systolic click, carotid bruits, JVD, peripheral edema      Neuro/Psych- negative ROS  GI/Hepatic/Renal/Endo    (+)  GERD,      ROS Comment: dysphagia    Musculoskeletal (-) negative ROS    Abdominal  - normal exam    Abdomen: soft.   Substance History - negative use     OB/GYN negative ob/gyn ROS         Other - negative ROS                       Anesthesia Plan    ASA 3     general     intravenous induction     Anesthetic plan, all risks, benefits, and alternatives have been provided, discussed and informed consent has been obtained with: patient.

## 2021-10-31 NOTE — ANESTHESIA POSTPROCEDURE EVALUATION
"Patient: Rita Christopher    Procedure Summary     Date: 10/31/21 Room / Location: [facility] OR  / [facility] MAIN OR    Anesthesia Start: 1122 Anesthesia Stop: 1209    Procedure: ESOPHAGOGASTRODUODENOSCOPY FOR FOOD BOLUS (N/A Esophagus) Diagnosis:     Surgeons: Osmani Marshall MD Provider: Last Gordon MD    Anesthesia Type: general ASA Status: 3          Anesthesia Type: general    Vitals  Vitals Value Taken Time   /88 10/31/21 1231   Temp 36.7 °C (98.1 °F) 10/31/21 1207   Pulse 80 10/31/21 1247   Resp 14 10/31/21 1230   SpO2 94 % 10/31/21 1247   Vitals shown include unvalidated device data.        Post Anesthesia Care and Evaluation    Patient location during evaluation: PACU  Patient participation: complete - patient participated  Level of consciousness: awake  Pain management: adequate  Airway patency: patent  Anesthetic complications: No anesthetic complications    Cardiovascular status: acceptable  Respiratory status: acceptable  Hydration status: acceptable    Comments: /88   Pulse 81   Temp 36.7 °C (98.1 °F) (Oral)   Resp 14   Ht 157.5 cm (62\")   Wt 49.4 kg (109 lb)   SpO2 99%   BMI 19.94 kg/m²         "

## 2021-10-31 NOTE — ANESTHESIA PROCEDURE NOTES
Airway  Urgency: elective    Date/Time: 10/31/2021 11:36 AM  Airway not difficult    General Information and Staff    Patient location during procedure: OR  Anesthesiologist: Last Gordon MD    Indications and Patient Condition  Indications for airway management: airway protection    Preoxygenated: yes  Mask difficulty assessment: 1 - vent by mask    Final Airway Details  Final airway type: endotracheal airway      Successful airway: ETT  Cuffed: yes   Successful intubation technique: direct laryngoscopy  Facilitating devices/methods: anterior pressure/BURP  Endotracheal tube insertion site: oral  Blade: Carolyn  Blade size: 3  ETT size (mm): 7.0  Cormack-Lehane Classification: grade IIa - partial view of glottis  Placement verified by: chest auscultation and capnometry   Cuff volume (mL): 5  Measured from: teeth  ETT/EBT  to teeth (cm): 22  Number of attempts at approach: 1  Assessment: lips, teeth, and gum same as pre-op and atraumatic intubation

## 2021-11-01 ENCOUNTER — TELEPHONE (OUTPATIENT)
Dept: GASTROENTEROLOGY | Facility: CLINIC | Age: 61
End: 2021-11-01

## 2021-11-01 VITALS
HEIGHT: 62 IN | SYSTOLIC BLOOD PRESSURE: 171 MMHG | DIASTOLIC BLOOD PRESSURE: 102 MMHG | BODY MASS INDEX: 20.06 KG/M2 | TEMPERATURE: 98 F | RESPIRATION RATE: 18 BRPM | HEART RATE: 89 BPM | WEIGHT: 109 LBS | OXYGEN SATURATION: 95 %

## 2021-11-01 LAB
ANION GAP SERPL CALCULATED.3IONS-SCNC: 15 MMOL/L (ref 5–15)
BASOPHILS # BLD AUTO: 0.02 10*3/MM3 (ref 0–0.2)
BASOPHILS NFR BLD AUTO: 0.3 % (ref 0–1.5)
BUN SERPL-MCNC: 4 MG/DL (ref 6–20)
BUN/CREAT SERPL: 6 (ref 7–25)
CALCIUM SPEC-SCNC: 8.4 MG/DL (ref 8.6–10.5)
CHLORIDE SERPL-SCNC: 97 MMOL/L (ref 98–107)
CO2 SERPL-SCNC: 25 MMOL/L (ref 22–29)
CREAT SERPL-MCNC: 0.67 MG/DL (ref 0.57–1)
DEPRECATED RDW RBC AUTO: 40.9 FL (ref 37–54)
EOSINOPHIL # BLD AUTO: 0.05 10*3/MM3 (ref 0–0.4)
EOSINOPHIL NFR BLD AUTO: 0.9 % (ref 0.3–6.2)
ERYTHROCYTE [DISTWIDTH] IN BLOOD BY AUTOMATED COUNT: 12.1 % (ref 12.3–15.4)
GFR SERPL CREATININE-BSD FRML MDRD: 90 ML/MIN/1.73
GLUCOSE SERPL-MCNC: 138 MG/DL (ref 65–99)
HCT VFR BLD AUTO: 40.7 % (ref 34–46.6)
HGB BLD-MCNC: 13.6 G/DL (ref 12–15.9)
IMM GRANULOCYTES # BLD AUTO: 0.01 10*3/MM3 (ref 0–0.05)
IMM GRANULOCYTES NFR BLD AUTO: 0.2 % (ref 0–0.5)
LYMPHOCYTES # BLD AUTO: 1.43 10*3/MM3 (ref 0.7–3.1)
LYMPHOCYTES NFR BLD AUTO: 25 % (ref 19.6–45.3)
MAGNESIUM SERPL-MCNC: 2 MG/DL (ref 1.6–2.6)
MCH RBC QN AUTO: 30.9 PG (ref 26.6–33)
MCHC RBC AUTO-ENTMCNC: 33.4 G/DL (ref 31.5–35.7)
MCV RBC AUTO: 92.5 FL (ref 79–97)
MONOCYTES # BLD AUTO: 0.51 10*3/MM3 (ref 0.1–0.9)
MONOCYTES NFR BLD AUTO: 8.9 % (ref 5–12)
NEUTROPHILS NFR BLD AUTO: 3.7 10*3/MM3 (ref 1.7–7)
NEUTROPHILS NFR BLD AUTO: 64.7 % (ref 42.7–76)
NRBC BLD AUTO-RTO: 0 /100 WBC (ref 0–0.2)
PLATELET # BLD AUTO: 261 10*3/MM3 (ref 140–450)
PMV BLD AUTO: 9.4 FL (ref 6–12)
POTASSIUM SERPL-SCNC: 3.5 MMOL/L (ref 3.5–5.2)
RBC # BLD AUTO: 4.4 10*6/MM3 (ref 3.77–5.28)
SODIUM SERPL-SCNC: 137 MMOL/L (ref 136–145)
WBC # BLD AUTO: 5.72 10*3/MM3 (ref 3.4–10.8)

## 2021-11-01 PROCEDURE — 83735 ASSAY OF MAGNESIUM: CPT | Performed by: STUDENT IN AN ORGANIZED HEALTH CARE EDUCATION/TRAINING PROGRAM

## 2021-11-01 PROCEDURE — 99213 OFFICE O/P EST LOW 20 MIN: CPT | Performed by: INTERNAL MEDICINE

## 2021-11-01 PROCEDURE — 80048 BASIC METABOLIC PNL TOTAL CA: CPT | Performed by: NURSE PRACTITIONER

## 2021-11-01 PROCEDURE — 99203 OFFICE O/P NEW LOW 30 MIN: CPT | Performed by: NURSE PRACTITIONER

## 2021-11-01 PROCEDURE — 0 AMPICILLIN-SULBACTAM PER 1.5 G: Performed by: THORACIC SURGERY (CARDIOTHORACIC VASCULAR SURGERY)

## 2021-11-01 PROCEDURE — G0378 HOSPITAL OBSERVATION PER HR: HCPCS

## 2021-11-01 PROCEDURE — 85025 COMPLETE CBC W/AUTO DIFF WBC: CPT | Performed by: NURSE PRACTITIONER

## 2021-11-01 RX ORDER — PANTOPRAZOLE SODIUM 40 MG/1
40 TABLET, DELAYED RELEASE ORAL DAILY
Qty: 30 TABLET | Refills: 0 | Status: SHIPPED | OUTPATIENT
Start: 2021-11-01 | End: 2021-12-08 | Stop reason: SDUPTHER

## 2021-11-01 RX ORDER — SUCRALFATE 1 G/1
1 TABLET ORAL
Status: DISCONTINUED | OUTPATIENT
Start: 2021-11-01 | End: 2021-11-01 | Stop reason: HOSPADM

## 2021-11-01 RX ORDER — SUCRALFATE 1 G/1
1 TABLET ORAL
Qty: 120 TABLET | Refills: 0 | Status: SHIPPED | OUTPATIENT
Start: 2021-11-01

## 2021-11-01 RX ADMIN — Medication 1 PATCH: at 10:33

## 2021-11-01 RX ADMIN — SODIUM CHLORIDE 3 G: 9 INJECTION, SOLUTION INTRAVENOUS at 13:18

## 2021-11-01 RX ADMIN — SUCRALFATE 1 G: 1 TABLET ORAL at 13:15

## 2021-11-01 RX ADMIN — SODIUM CHLORIDE 3 G: 9 INJECTION, SOLUTION INTRAVENOUS at 01:58

## 2021-11-01 RX ADMIN — SODIUM CHLORIDE, PRESERVATIVE FREE 10 ML: 5 INJECTION INTRAVENOUS at 08:23

## 2021-11-01 RX ADMIN — POTASSIUM CHLORIDE 40 MEQ: 1.5 POWDER, FOR SOLUTION ORAL at 11:46

## 2021-11-01 RX ADMIN — PANTOPRAZOLE SODIUM 40 MG: 40 INJECTION, POWDER, FOR SOLUTION INTRAVENOUS at 08:21

## 2021-11-01 RX ADMIN — SODIUM CHLORIDE 3 G: 9 INJECTION, SOLUTION INTRAVENOUS at 08:22

## 2021-11-01 NOTE — PROGRESS NOTES
[facility]  Inpatient Progress Note    Reason for Follow Up: Food bolus    Subjective     Interval History:   Patient with EGD yesterday with movable of esophageal food bolus.  Concern for perforation post procedure.  Esophagram was performed which was negative for perforation.    She denies chest pain or difficulty swallowing clear liquids.  No pain with swallowing.    Current Facility-Administered Medications:   •  ampicillin-sulbactam (UNASYN) 3 g in sodium chloride 0.9 % 100 mL IVPB-VTB, 3 g, Intravenous, Q6H, Vani Giraldo MD, 3 g at 11/01/21 0822  •  Magnesium Sulfate 2 gram Bolus, followed by 8 gram infusion (total Mg dose 10 grams)- Mg less than or equal to 1mg/dL, 2 g, Intravenous, PRN **OR** Magnesium Sulfate 2 gram / 50mL Infusion (GIVE X 3 BAGS TO EQUAL 6GM TOTAL DOSE) - Mg 1.1 - 1.5 mg/dl, 2 g, Intravenous, PRN **OR** Magnesium Sulfate 4 gram infusion- Mg 1.6-1.9 mg/dL, 4 g, Intravenous, PRN, Dennise Jackman, APRN  •  nicotine (NICODERM CQ) 14 MG/24HR patch 1 patch, 1 patch, Transdermal, Q24H, Delaney Solano MD, 1 patch at 11/01/21 1033  •  nitroglycerin (NITROSTAT) SL tablet 0.4 mg, 0.4 mg, Sublingual, Q5 Min PRN, Delaney Solano MD  •  ondansetron (ZOFRAN) injection 4 mg, 4 mg, Intravenous, Q6H PRN, Delaney Solano MD  •  pantoprazole (PROTONIX) injection 40 mg, 40 mg, Intravenous, Q AM, Dennise Jackman APRN, 40 mg at 11/01/21 0821  •  potassium chloride (K-DUR,KLOR-CON) ER tablet 40 mEq, 40 mEq, Oral, PRN **OR** potassium chloride (KLOR-CON) packet 40 mEq, 40 mEq, Oral, PRN, 40 mEq at 10/31/21 6741 **OR** potassium chloride 10 mEq in 100 mL IVPB, 10 mEq, Intravenous, Q1H PRN, Dennise Jackman APRN  •  [COMPLETED] Insert peripheral IV, , , Once **AND** sodium chloride 0.9 % flush 10 mL, 10 mL, Intravenous, PRN, Delaney Solano MD  •  sodium chloride 0.9 % flush 10 mL, 10 mL, Intravenous, Q12H, Delaney Solano MD, 10 mL at 11/01/21 0823  •   sodium chloride 0.9 % flush 10 mL, 10 mL, Intravenous, PRN, Delaney Solano MD  Review of Systems:    Negative for chest pain, negative for odynophagia or dysphagia, negative for fevers or chills    Objective     Vital Signs  Temp:  [97.6 °F (36.4 °C)-98.6 °F (37 °C)] 97.6 °F (36.4 °C)  Heart Rate:  [77-89] 88  Resp:  [14-16] 16  BP: (120-160)/() 154/100  Body mass index is 19.94 kg/m².    Intake/Output Summary (Last 24 hours) at 11/1/2021 1037  Last data filed at 11/1/2021 0822  Gross per 24 hour   Intake 900 ml   Output 0 ml   Net 900 ml     I/O this shift:  In: 400 [IV Piggyback:400]  Out: -      Physical Exam:   General: patient awake, alert and cooperative   Eyes: Normal lids and lashes, no scleral icterus   Neck: supple, normal ROM   Skin: warm and dry, not jaundiced   Pulm:   regular and unlabored   Abdomen: soft, nontender, nondistended    Extremities: no rash or edema   Psychiatric: Normal mood and behavior; memory intact     Results Review:     I reviewed the patient's new clinical results.    Results from last 7 days   Lab Units 11/01/21  0551 10/31/21  0243   WBC 10*3/mm3 5.72 3.94   HEMOGLOBIN g/dL 13.6 13.1   HEMATOCRIT % 40.7 37.6   PLATELETS 10*3/mm3 261 278     Results from last 7 days   Lab Units 11/01/21  0928 10/31/21  0509   SODIUM mmol/L 137 144   POTASSIUM mmol/L 3.5 3.1*   CHLORIDE mmol/L 97* 103   CO2 mmol/L 25.0 28.0   BUN mg/dL 4* 8   CREATININE mg/dL 0.67 0.43*   CALCIUM mg/dL 8.4* 8.2*   BILIRUBIN mg/dL  --  0.2   ALK PHOS U/L  --  105   ALT (SGPT) U/L  --  14   AST (SGOT) U/L  --  22   GLUCOSE mg/dL 138* 87         No results found for: LIPASE    Radiology:  FL Esophagram Complete Single Contrast   Final Result          Assessment/Plan     Patient Active Problem List   Diagnosis   • Esophageal obstruction due to food impaction   • Food bolus obstruction of intestine (HCC)   • HTN (hypertension)   • Osteoporosis   • Hypokalemia     All problems are new to me  today  Assessment:  1. Esophageal stricture with food impaction status post EGD with concern for esophageal tear.  Negative esophagram      Plan:  · Advance diet to soft diet as tolerated.    · Carafate 4 times daily until repeat egd  · Daily PPI  · Repeat EGD in 6 weeks to dilate stricture.  No meat or dense foods until repeat EGD performed.  · Okay to discharge from GI standpoint.  We will schedule outpatient follow-up with Dr. Kumar    I discussed the patients findings and my recommendations with patient.    Mayra Robertson MD

## 2021-11-01 NOTE — PROGRESS NOTES
Case Management Discharge Note      Final Note: Discharged home. Cristina Iverson RN    Provided Post Acute Provider List?: N/A  N/A Provider List Comment: Home without services.  Provided Post Acute Provider Quality & Resource List?: N/A  N/A Quality & Resource List Comment: Home without services.    Transportation Services  Private: Car    Final Discharge Disposition Code: 01 - home or self-care

## 2021-11-01 NOTE — CONSULTS
Consults    Patient Care Team:  Patience Brown MD as PCP - General (Family Medicine)    Chief Complaint   Patient presents with   • Difficulty Swallowing       Subjective     History of Present Illness     Rita Christopher is a 60-year-old female who we were asked to see after she underwent EGD with food bolus removal yesterday by gastroenterology and there was concern for esophageal mucosal tear.  Unfortunately, this is the patient's second food bolus removal from her esophagus in the last few weeks.  The first time she was eating a steak and this time she was eating chicken.  She underwent esophagram yesterday which demonstrated no leak and was advanced to clear liquid diet by GI.  On examination today, she reports no painful or difficulty swallowing.  She is eager to discharge home.  He is showing no signs or symptoms of mediastinitis.  Has been on Unasyn since yesterday.    Review of Systems   Constitutional: Negative for appetite change, chills, fatigue and fever.   HENT: Positive for trouble swallowing.    Respiratory: Positive for choking. Negative for cough, shortness of breath and wheezing.    Cardiovascular: Negative for chest pain and palpitations.   Gastrointestinal: Negative for constipation, diarrhea, nausea and vomiting.   Musculoskeletal: Negative.    Skin: Negative.    Neurological: Negative for syncope, speech difficulty and weakness.   Psychiatric/Behavioral: Negative.    All other systems reviewed and are negative.       Patient Active Problem List   Diagnosis   • Esophageal obstruction due to food impaction   • Food bolus obstruction of intestine (HCC)   • HTN (hypertension)   • Osteoporosis   • Hypokalemia     Past Medical History:   Diagnosis Date   • Hypertension    • Osteoporosis      Past Surgical History:   Procedure Laterality Date   • ENDOSCOPY N/A 10/11/2021    Procedure: ESOPHAGOGASTRODUODENOSCOPY with possible interventions  ;  Surgeon: Connie Kumar MD;  Location: [facility]  MAIN OR;  Service: Gastroenterology;  Laterality: N/A;  food bolus   post op -food bolus espophogeal stricture at 16cm and mild gastritis   • ENDOSCOPY N/A 10/31/2021    Procedure: ESOPHAGOGASTRODUODENOSCOPY FOR FOOD BOLUS;  Surgeon: Osmani Marshall MD;  Location: [facility] MAIN OR;  Service: Gastroenterology;  Laterality: N/A;  pre-food bolus  post-esophageal trauma, esophageal stricture   • FEMUR FRACTURE SURGERY     • TUBAL ABDOMINAL LIGATION       Family History   Family history unknown: Yes     Social History     Socioeconomic History   • Marital status: Single   Tobacco Use   • Smoking status: Current Every Day Smoker     Packs/day: 1.00   Substance and Sexual Activity   • Alcohol use: Yes     Comment: 2-3 beers per day   • Drug use: Yes     Types: Marijuana   • Sexual activity: Defer     Medications Prior to Admission   Medication Sig Dispense Refill Last Dose   • Alendronate Sodium (FOSAMAX PO) Take  by mouth 1 (One) Time Per Week.      • amLODIPine (NORVASC) 10 MG tablet 10 mg.      • cholecalciferol (VITAMIN D3) 1000 units tablet Take 1,000 Units by mouth Daily.      • HYDROCHLOROTHIAZIDE PO Take  by mouth Daily.      • pantoprazole (PROTONIX) 20 MG EC tablet Take 1 tablet by mouth Every Morning Before Breakfast. 30 tablet 5    • POTASSIUM PO Take  by mouth.        Allergies   Allergen Reactions   • Lisinopril Swelling     Lip swelling   • Paroxetine Other (See Comments)     Ataxia       Objective      Vital Signs  Temp:  [97.6 °F (36.4 °C)-98.6 °F (37 °C)] 97.6 °F (36.4 °C)  Heart Rate:  [77-88] 88  Resp:  [14-16] 16  BP: (120-154)/() 154/100    Intake & Output (last day)       10/31 0701  11/01 0700 11/01 0701  11/02 0700    I.V. (mL/kg) 500 (10.1)     IV Piggyback  400    Total Intake(mL/kg) 500 (10.1) 400 (8.1)    Urine (mL/kg/hr) 0 (0)     Blood 0     Total Output 0     Net +500 +400          Urine Unmeasured Occurrence 3 x           Physical Exam  Vitals and nursing note reviewed.    Constitutional:       General: She is not in acute distress.     Appearance: Normal appearance. She is not ill-appearing.   HENT:      Head: Normocephalic and atraumatic.      Mouth/Throat:      Pharynx: Oropharynx is clear.   Eyes:      General: No scleral icterus.     Conjunctiva/sclera: Conjunctivae normal.   Cardiovascular:      Rate and Rhythm: Normal rate and regular rhythm.   Pulmonary:      Effort: Pulmonary effort is normal.      Breath sounds: Normal breath sounds.   Abdominal:      General: Bowel sounds are normal. There is no distension.      Palpations: Abdomen is soft. There is no mass.   Musculoskeletal:         General: Normal range of motion.      Cervical back: Neck supple.   Skin:     General: Skin is warm.   Neurological:      General: No focal deficit present.      Mental Status: Mental status is at baseline.   Psychiatric:         Mood and Affect: Mood normal.         Behavior: Behavior normal.         Results Review:    I reviewed the patient's new clinical results.  I reviewed the patient's new imaging results and agree with the interpretation.  I reviewed the patient's other test results and agree with the interpretation  Discussed with patient, RN and Dr. Gregorio.    Imaging Results (Last 24 Hours)     Procedure Component Value Units Date/Time    FL Esophagram Complete Single Contrast [[id number]] Collected: 10/31/21 1528     Updated: 10/31/21 1636    Narrative:      GASTROGRAFIN ESOPHAGRAM     HISTORY: Recent endoscopic removal of food bolus. Evaluate for tear or  perforation or stricture.     TECHNIQUE/FINDINGS: An initial plain film of the chest was obtained. The  lungs are well-expanded and clear and the heart and hilar structures are  normal.     The patient swallowed large sips of Gastrografin without difficulty.  There is normal distensibility of the hypopharynx and cervical  esophagus. The remainder of the esophagus also appears normal. There is  no evidence of perforation or  ulceration. No stricture is seen.     CONCLUSION: Negative Gastrografin esophagram as described above. 128  images were obtained and the fluoroscopy time measures 44 seconds.     This report was called directly to the nursing staff on [address] at the  time of the dictation.     This report was finalized on 10/31/2021 4:33 PM by Dr. Guido Montes De Oca M.D.             Lab Results:  Lab Results (last 24 hours)     Procedure Component Value Units Date/Time    Basic Metabolic Panel [[id number]]  (Abnormal) Collected: 11/01/21 0928    Specimen: Blood Updated: 11/01/21 1034     Glucose 138 mg/dL      BUN 4 mg/dL      Creatinine 0.67 mg/dL      Sodium 137 mmol/L      Potassium 3.5 mmol/L      Chloride 97 mmol/L      CO2 25.0 mmol/L      Calcium 8.4 mg/dL      eGFR Non African Amer 90 mL/min/1.73      BUN/Creatinine Ratio 6.0     Anion Gap 15.0 mmol/L     Narrative:      GFR Normal >60  Chronic Kidney Disease <60  Kidney Failure <15      Magnesium [[id number]]  (Normal) Collected: 11/01/21 0928    Specimen: Blood Updated: 11/01/21 1017     Magnesium 2.0 mg/dL     CBC & Differential [[id number]]  (Abnormal) Collected: 11/01/21 0551    Specimen: Blood Updated: 11/01/21 0657    Narrative:      The following orders were created for panel order CBC & Differential.  Procedure                               Abnormality         Status                     ---------                               -----------         ------                     CBC Auto Differential[[id number]]        Abnormal            Final result                 Please view results for these tests on the individual orders.    CBC Auto Differential [[id number]]  (Abnormal) Collected: 11/01/21 0551    Specimen: Blood Updated: 11/01/21 0657     WBC 5.72 10*3/mm3      RBC 4.40 10*6/mm3      Hemoglobin 13.6 g/dL      Hematocrit 40.7 %      MCV 92.5 fL      MCH 30.9 pg      MCHC 33.4 g/dL      RDW 12.1 %      RDW-SD 40.9 fl      MPV 9.4 fL      Platelets 261 10*3/mm3      Neutrophil  % 64.7 %      Lymphocyte % 25.0 %      Monocyte % 8.9 %      Eosinophil % 0.9 %      Basophil % 0.3 %      Immature Grans % 0.2 %      Neutrophils, Absolute 3.70 10*3/mm3      Lymphocytes, Absolute 1.43 10*3/mm3      Monocytes, Absolute 0.51 10*3/mm3      Eosinophils, Absolute 0.05 10*3/mm3      Basophils, Absolute 0.02 10*3/mm3      Immature Grans, Absolute 0.01 10*3/mm3      nRBC 0.0 /100 WBC     Magnesium [[id number]]  (Normal) Collected: 10/31/21 0509    Specimen: Blood Updated: 10/31/21 1512     Magnesium 2.2 mg/dL               Assessment/Plan       Food bolus obstruction of intestine (HCC)    HTN (hypertension)    Osteoporosis    Hypokalemia      Assessment & Plan    I independently reviewed the imaging from yesterday's esophagram which demonstrates no evidence of leak or extravasation.    Food bolus: Patient has been tolerating clear liquids per order by gastroenterology.  They recommend advancement to soft diet at this point.  Given that the patient is stable and demonstrates no evidence of mediastinitis or esophageal leak, we will defer to their management.  She may discharge home on a soft diet per gastroenterology and has planned follow-up with Dr. Kumar.    I discussed the patients findings and our recommendations with patient, nursing staff and Dr. Giraldo and Dr. Gregorio.    Thank you for this consult and allowing us to participate in the care of your patient.  We will see patient as needed.  Please call our service at any time at [phone].      SHAKEEL Alvarado  [facility]  11/01/21  13:03 EDT    Patient was seen and assessed while wearing personal protective equipment including facemask, protective eyewear and gloves.  Hand hygiene performed prior to entering the room and upon exiting with doffing of gloves.

## 2021-11-01 NOTE — TELEPHONE ENCOUNTER
Left message for patient to call back to schedule repeat EGD with dilation early February before she sees José in office next per SM

## 2021-11-01 NOTE — PLAN OF CARE
Goal Outcome Evaluation:  Plan of Care Reviewed With: patient        Progress: improving  Outcome Summary: Tolerating clear liquid diet, vss, afebrile, potassium replaced  per protocol, requires one more dose, pt began to c/o tingling and pain in her left hand that she normally she is normally able to shake off until the sensation is gone. Hand elevated with pillow, will continue to monitor.

## 2021-11-01 NOTE — CONSULTS
"Adult Nutrition  Assessment/PES    Patient Name:  Rita Christopher  YOB: 1960  MRN: [id number]  Admit Date:  10/30/2021    Assessment Date:  11/1/2021  Nutrition assessment triggered by diet education consult regarding high potassium foods and soft foods.  EMR reviewed. Labs. meds reviewed.   Admitted with hypokalemia, food bolus.  Spoke with patient via room phone-went over potassium foods and how to meet nutritional needs (especially protein) with consuming soft foods, avoiding meat. Plan to mail her nutrition education material. D/c today.     Reason for Assessment     Row Name 11/01/21 1430          Reason for Assessment    Reason For Assessment nurse/nurse practitioner consult     Diagnosis hypokalemia, food bolus, HTN, osteoporosis     Identified At Risk by Screening Criteria need for education                Nutrition/Diet History     Row Name 11/01/21 1430          Nutrition/Diet History    Typical Food/Fluid Intake GI soft diet, fair appetite                Anthropometrics     Row Name 11/01/21 1430          Anthropometrics    Height 157.5 cm (62\")            Admit Weight    Admit Weight 49.4 kg (108 lb 14.5 oz)            Ideal Body Weight (IBW)    Ideal Body Weight (IBW) (kg) 50.43     % of Ideal Body Weight Assessment --  98%            Body Mass Index (BMI)    BMI Assessment BMI 18.5-24.9: normal                Labs/Tests/Procedures/Meds     Row Name 11/01/21 1431          Labs/Procedures/Meds    Lab Results Reviewed reviewed, pertinent            Diagnostic Tests/Procedures    Diagnostic Test/Procedure Reviewed reviewed, pertinent            Medications    Pertinent Medications Reviewed reviewed, pertinent                  Estimated/Assessed Needs     Row Name 11/01/21 1430          Calculation Measurements    Height 157.5 cm (62\")                Nutrition Prescription Ordered     Row Name 11/01/21 1431          Nutrition Prescription PO    Current PO Diet Soft Texture     Texture " Whole foods                Evaluation of Received Nutrient/Fluid Intake     Row Name 11/01/21 1431          PO Evaluation    Number of Meals 1     % PO Intake 25                 Electronically signed by:  Floresita Davalos RD  11/01/21 14:31 EDT

## 2021-11-01 NOTE — DISCHARGE SUMMARY
Patient Name: Rita Christopher  : 1960  MRN: [id number]    Date of Admission: 10/30/2021  Date of Discharge:  2021  Primary Care Physician: Patience Brown MD      Chief Complaint:   Difficulty Swallowing      Discharge Diagnoses     Active Hospital Problems    Diagnosis  POA   • **Food bolus obstruction of intestine (HCC) [K56.699]  Yes   • HTN (hypertension) [I10]  Yes   • Osteoporosis [M81.0]  Yes   • Hypokalemia [E87.6]  Unknown      Resolved Hospital Problems   No resolved problems to display.        Hospital Course     Ms. Christopher is a 60 y.o. female with a history of hypertension who presented to [facility] initially complaining of inability to swallow.  Please see the admitting history and physical for further details.  She was found to have food bolus and was admitted to the hospital for further evaluation and treatment.  GI performed emergent endoscopy as she could not clear her secretions, they removed a piece of chicken and she was able to swallow again well.  There was concern that there was an esophageal tear during the procedure so she was admitted for thoracic surgery consult and esophagram.  Esophagram negative, thoracic surgery okay with discharge.  She needs to continue daily PPI, Carafate 4 times a day and eat soft food until follow-up EGD with GI.  Repeat EGD in 6 weeks with Dr. Kumar.    Day of Discharge     Subjective:  Patient anxious to be discharged home today.  Denies chest pain, shortness of breath, nausea, vomiting.  Denies any trouble swallowing this morning.    Review of Systems   Constitutional: Negative for fatigue and fever.   HENT: Negative for sinus pressure and sore throat.    Eyes: Negative for pain and redness.   Respiratory: Negative for cough and shortness of breath.    Cardiovascular: Negative for chest pain, palpitations and leg swelling.   Gastrointestinal: Negative for abdominal pain, nausea and vomiting.   Skin: Negative for rash  and wound.   Psychiatric/Behavioral: Negative for behavioral problems and dysphoric mood.       Physical Exam:  Temp:  [97.6 °F (36.4 °C)-98.2 °F (36.8 °C)] 98 °F (36.7 °C)  Heart Rate:  [77-89] 89  Resp:  [16-18] 18  BP: (120-171)/() 171/102  Body mass index is 19.94 kg/m².  Physical Exam  Constitutional:       General: She is not in acute distress.     Appearance: Normal appearance. She is normal weight. She is not ill-appearing.   HENT:      Head: Normocephalic and atraumatic.      Nose: Nose normal.      Mouth/Throat:      Mouth: Mucous membranes are moist.      Pharynx: Oropharynx is clear.   Eyes:      Extraocular Movements: Extraocular movements intact.      Conjunctiva/sclera: Conjunctivae normal.      Pupils: Pupils are equal, round, and reactive to light.   Cardiovascular:      Rate and Rhythm: Normal rate and regular rhythm.      Pulses: Normal pulses.   Pulmonary:      Effort: Pulmonary effort is normal. No respiratory distress.      Breath sounds: Normal breath sounds. No wheezing.   Abdominal:      General: Abdomen is flat. Bowel sounds are normal. There is no distension.      Palpations: Abdomen is soft.   Musculoskeletal:         General: No swelling or tenderness. Normal range of motion.      Cervical back: Normal range of motion and neck supple.      Right lower leg: No edema.      Left lower leg: No edema.   Skin:     General: Skin is warm and dry.   Neurological:      General: No focal deficit present.      Mental Status: She is alert and oriented to person, place, and time. Mental status is at baseline.   Psychiatric:         Mood and Affect: Mood normal.         Behavior: Behavior normal.         Thought Content: Thought content normal.         Judgment: Judgment normal.         Consultants     Consult Orders (all) (From admission, onward)     Start     Ordered    11/01/21 1153  Inpatient Nutrition Consult  Once        Comments: Pt  need soft diet , no meat ( per [facility]) &  potassium rich foods, please bring info & discuss with pt.   Provider:  (Not yet assigned)    11/01/21 1153    11/01/21 0826  Inpatient Nutrition Consult  Once        Comments: Pt requesting K rich foods,   Provider:  (Not yet assigned)    11/01/21 0826    10/31/21 0702  Inpatient Gastroenterology Consult  IN AM        Specialty:  Gastroenterology  Provider:  Johnson Edgar MD    10/31/21 0016    10/31/21 0017  Inpatient Gastroenterology Consult  Once,   Status:  Canceled        Specialty:  Gastroenterology  Provider:  Mayra Robertson MD    10/31/21 0016    10/30/21 2251  Gastroenterology (on-call MD unless specified)  Once        Specialty:  Gastroenterology  Provider:  (Not yet assigned)    10/30/21 2250              Procedures     Imaging Results (All)     Procedure Component Value Units Date/Time    FL Esophagram Complete Single Contrast [[id number]] Collected: 10/31/21 1528     Updated: 10/31/21 1636    Narrative:      GASTROGRAFIN ESOPHAGRAM     HISTORY: Recent endoscopic removal of food bolus. Evaluate for tear or  perforation or stricture.     TECHNIQUE/FINDINGS: An initial plain film of the chest was obtained. The  lungs are well-expanded and clear and the heart and hilar structures are  normal.     The patient swallowed large sips of Gastrografin without difficulty.  There is normal distensibility of the hypopharynx and cervical  esophagus. The remainder of the esophagus also appears normal. There is  no evidence of perforation or ulceration. No stricture is seen.     CONCLUSION: Negative Gastrografin esophagram as described above. 128  images were obtained and the fluoroscopy time measures 44 seconds.     This report was called directly to the nursing staff on [address] at the  time of the dictation.     This report was finalized on 10/31/2021 4:33 PM by Dr. Guido Montes De Oca M.D.             Pertinent Labs     Results from last 7 days   Lab Units 11/01/21  0551 10/31/21  0243   WBC 10*3/mm3 5.72  3.94   HEMOGLOBIN g/dL 13.6 13.1   PLATELETS 10*3/mm3 261 278     Results from last 7 days   Lab Units 11/01/21  0928 10/31/21  0509   SODIUM mmol/L 137 144   POTASSIUM mmol/L 3.5 3.1*   CHLORIDE mmol/L 97* 103   CO2 mmol/L 25.0 28.0   BUN mg/dL 4* 8   CREATININE mg/dL 0.67 0.43*   GLUCOSE mg/dL 138* 87   Estimated Creatinine Clearance: 71.4 mL/min (by C-G formula based on SCr of 0.67 mg/dL).  Results from last 7 days   Lab Units 10/31/21  0509   ALBUMIN g/dL 4.00   BILIRUBIN mg/dL 0.2   ALK PHOS U/L 105   AST (SGOT) U/L 22   ALT (SGPT) U/L 14     Results from last 7 days   Lab Units 11/01/21  0928 10/31/21  0509   CALCIUM mg/dL 8.4* 8.2*   ALBUMIN g/dL  --  4.00   MAGNESIUM mg/dL 2.0 2.2               Invalid input(s): LDLCALC        Test Results Pending at Discharge       Discharge Details        Discharge Medications      New Medications      Instructions Start Date   sucralfate 1 g tablet  Commonly known as: CARAFATE   1 g, Oral, 4 Times Daily Before Meals & Nightly         Changes to Medications      Instructions Start Date   pantoprazole 40 MG EC tablet  Commonly known as: PROTONIX  What changed:   · medication strength  · how much to take  · when to take this   40 mg, Oral, Daily         Continue These Medications      Instructions Start Date   amLODIPine 10 MG tablet  Commonly known as: NORVASC   10 mg      cholecalciferol 25 MCG (1000 UT) tablet  Commonly known as: VITAMIN D3   1,000 Units, Oral, Daily      FOSAMAX PO   Oral, Weekly      HYDROCHLOROTHIAZIDE PO   Oral, Daily      POTASSIUM PO   Oral             Allergies   Allergen Reactions   • Lisinopril Swelling     Lip swelling   • Paroxetine Other (See Comments)     Ataxia         Discharge Disposition:  Home or Self Care    Discharge Diet:  Diet Order   Procedures   • Diet Regular; GI Soft       Discharge Activity:   Activity Instructions     Activity as Tolerated            CODE STATUS:    Code Status and Medical Interventions:   Ordered at: 10/30/21  2331     Level Of Support Discussed With:    Patient     Code Status:    CPR     Medical Interventions (Level of Support Prior to Arrest):    Full       Future Appointments   Date Time Provider Department Center   3/7/2022  8:30 AM Connie Kumar MD [facility]      Follow-up Information     Patience Brown MD Follow up in 1 week(s).    Specialty: Family Medicine  Contact information:  [address]  [address]  [facility] [address]  [phone]             Osmani Marshall MD Follow up.    Specialty: Gastroenterology  Why: follow up if needed per GI  Contact information:  [address]  [address]  [facility] [address]  [phone]                         Time Spent on Discharge:  Greater than 30 minutes      Delaney Solano MD  [facility]  11/01/21  14:24 EDT

## 2021-11-02 DIAGNOSIS — K22.2 BENIGN ESOPHAGEAL STRICTURE: Primary | ICD-10-CM

## 2021-11-02 DIAGNOSIS — K22.2 ESOPHAGEAL OBSTRUCTION DUE TO FOOD IMPACTION: ICD-10-CM

## 2021-11-02 DIAGNOSIS — T18.128A ESOPHAGEAL OBSTRUCTION DUE TO FOOD IMPACTION: ICD-10-CM

## 2021-11-02 RX ORDER — SODIUM CHLORIDE, SODIUM LACTATE, POTASSIUM CHLORIDE, CALCIUM CHLORIDE 600; 310; 30; 20 MG/100ML; MG/100ML; MG/100ML; MG/100ML
30 INJECTION, SOLUTION INTRAVENOUS CONTINUOUS
Status: CANCELLED | OUTPATIENT
Start: 2021-12-06

## 2021-11-02 NOTE — TELEPHONE ENCOUNTER
JAKOB patient for repeat EGD with dilation. Scheduled 01/04/2022 arrive at 7:00am Prep packet mailed to verified address. JAKOB [place]    MT/SA- patient is still having episodes of not swallowing well and is requesting a call back with any recommendations

## 2021-11-05 NOTE — TELEPHONE ENCOUNTER
"Call to pt.  Asking what she can safely eat to prevent another choking episode.  Was expecting info from RD, but has not received.  Message to Floresita Davalos RD.      Spouse came on line.  Asking if egd with possible dilation planned for 12/6 will be a \"permanent solution\", or will she have to have this every so often.  States this will cost money, and if other step available - should that be tried first.  Message to DR Kumar.   "

## 2021-11-05 NOTE — TELEPHONE ENCOUNTER
Endoscopic dilation is the safest for step for treating this.  Sometimes it requires more than one dilation to get relief and sometimes it needs to be repeated after months to years..    Her best means for avoiding the hospitalizations she has had is to carefully monitor her diet.

## 2021-11-08 NOTE — TELEPHONE ENCOUNTER
"Call to pt.   Advise per DR Kumar note.  Verb understanding.     States received info from dietician.  [facility] has many questions about info received.  Does not have access to computer.  Would like Floresita Welch RD to call her to discuss further.  \"I'm very confused\".    "

## 2021-11-12 ENCOUNTER — TELEPHONE (OUTPATIENT)
Dept: GASTROENTEROLOGY | Facility: CLINIC | Age: 61
End: 2021-11-12

## 2021-11-12 NOTE — TELEPHONE ENCOUNTER
Given she has had serious issues with food impactions, would avoid steak and chicken until re-evaluation.    Only response to ppi and repeat EGD with dilation will be able to determine if she can eat steak down the road.  I am unable to predict this at the present

## 2021-11-12 NOTE — TELEPHONE ENCOUNTER
----- Message from Artemio Wilder sent at 11/11/2021 10:43 AM EST -----  Regarding: Questions  Contact: [phone]  Questiond about her diet. Before surgery questions, She said she talked to kalani and wants to talk to her again.  Could you please call around 3:00 ??    Also covid testing..

## 2021-11-12 NOTE — TELEPHONE ENCOUNTER
"Call to pt.   Wants to speak with nutritionist.  Advise may contact Nutrition @ [phone].    Spouse came on line and asking if any other alternative/procedure to do so that pt can eat \"normally\".      Reiterate Dr Kumar response as per encounter note of 11/1.  Spouse reiterates question as to how pt can get back to eating \"regular foods - like if I grill a steak\".      Question to DR Kumar.   "

## 2021-11-30 ENCOUNTER — TRANSCRIBE ORDERS (OUTPATIENT)
Dept: GASTROENTEROLOGY | Facility: CLINIC | Age: 61
End: 2021-11-30

## 2021-11-30 DIAGNOSIS — Z01.818 OTHER SPECIFIED PRE-OPERATIVE EXAMINATION: Primary | ICD-10-CM

## 2021-12-02 ENCOUNTER — TELEPHONE (OUTPATIENT)
Dept: GASTROENTEROLOGY | Facility: CLINIC | Age: 61
End: 2021-12-02

## 2021-12-02 NOTE — TELEPHONE ENCOUNTER
----- Message from Artemio Troy sent at 12/2/2021  9:04 AM EST -----  Regarding: Adalberto Sheldon from [facility]([phone]) calling for orders for pt, need today by 3

## 2021-12-03 ENCOUNTER — LAB (OUTPATIENT)
Dept: LAB | Facility: HOSPITAL | Age: 61
End: 2021-12-03

## 2021-12-03 DIAGNOSIS — Z01.818 OTHER SPECIFIED PRE-OPERATIVE EXAMINATION: Primary | ICD-10-CM

## 2021-12-03 PROCEDURE — C9803 HOPD COVID-19 SPEC COLLECT: HCPCS

## 2021-12-03 PROCEDURE — U0004 COV-19 TEST NON-CDC HGH THRU: HCPCS

## 2021-12-04 LAB — SARS-COV-2 ORF1AB RESP QL NAA+PROBE: NOT DETECTED

## 2021-12-06 ENCOUNTER — HOSPITAL ENCOUNTER (OUTPATIENT)
Facility: HOSPITAL | Age: 61
Setting detail: HOSPITAL OUTPATIENT SURGERY
Discharge: HOME | End: 2021-12-06
Attending: INTERNAL MEDICINE | Admitting: INTERNAL MEDICINE

## 2021-12-06 ENCOUNTER — ANESTHESIA EVENT (OUTPATIENT)
Dept: GASTROENTEROLOGY | Facility: HOSPITAL | Age: 61
End: 2021-12-06

## 2021-12-06 ENCOUNTER — ANESTHESIA (OUTPATIENT)
Dept: GASTROENTEROLOGY | Facility: HOSPITAL | Age: 61
End: 2021-12-06

## 2021-12-06 VITALS
SYSTOLIC BLOOD PRESSURE: 142 MMHG | WEIGHT: 109.3 LBS | HEART RATE: 79 BPM | OXYGEN SATURATION: 98 % | BODY MASS INDEX: 20.11 KG/M2 | DIASTOLIC BLOOD PRESSURE: 78 MMHG | RESPIRATION RATE: 16 BRPM | TEMPERATURE: 97.9 F | HEIGHT: 62 IN

## 2021-12-06 DIAGNOSIS — T18.128A ESOPHAGEAL OBSTRUCTION DUE TO FOOD IMPACTION: ICD-10-CM

## 2021-12-06 DIAGNOSIS — K22.2 ESOPHAGEAL OBSTRUCTION DUE TO FOOD IMPACTION: ICD-10-CM

## 2021-12-06 DIAGNOSIS — K22.2 BENIGN ESOPHAGEAL STRICTURE: ICD-10-CM

## 2021-12-06 PROCEDURE — C1726 CATH, BAL DIL, NON-VASCULAR: HCPCS | Performed by: INTERNAL MEDICINE

## 2021-12-06 PROCEDURE — 43239 EGD BIOPSY SINGLE/MULTIPLE: CPT | Performed by: INTERNAL MEDICINE

## 2021-12-06 PROCEDURE — 88305 TISSUE EXAM BY PATHOLOGIST: CPT | Performed by: INTERNAL MEDICINE

## 2021-12-06 PROCEDURE — S0260 H&P FOR SURGERY: HCPCS | Performed by: INTERNAL MEDICINE

## 2021-12-06 PROCEDURE — 43249 ESOPH EGD DILATION <30 MM: CPT | Performed by: INTERNAL MEDICINE

## 2021-12-06 PROCEDURE — 25010000002 PROPOFOL 10 MG/ML EMULSION: Performed by: ANESTHESIOLOGY

## 2021-12-06 RX ORDER — PROPOFOL 10 MG/ML
VIAL (ML) INTRAVENOUS CONTINUOUS PRN
Status: DISCONTINUED | OUTPATIENT
Start: 2021-12-06 | End: 2021-12-06 | Stop reason: SURG

## 2021-12-06 RX ORDER — PROPOFOL 10 MG/ML
VIAL (ML) INTRAVENOUS AS NEEDED
Status: DISCONTINUED | OUTPATIENT
Start: 2021-12-06 | End: 2021-12-06 | Stop reason: SURG

## 2021-12-06 RX ORDER — SODIUM CHLORIDE, SODIUM LACTATE, POTASSIUM CHLORIDE, CALCIUM CHLORIDE 600; 310; 30; 20 MG/100ML; MG/100ML; MG/100ML; MG/100ML
30 INJECTION, SOLUTION INTRAVENOUS CONTINUOUS
Status: DISCONTINUED | OUTPATIENT
Start: 2021-12-06 | End: 2021-12-06 | Stop reason: HOSPADM

## 2021-12-06 RX ORDER — LIDOCAINE HYDROCHLORIDE 20 MG/ML
INJECTION, SOLUTION INFILTRATION; PERINEURAL AS NEEDED
Status: DISCONTINUED | OUTPATIENT
Start: 2021-12-06 | End: 2021-12-06 | Stop reason: SURG

## 2021-12-06 RX ADMIN — Medication 50 MG: at 12:47

## 2021-12-06 RX ADMIN — Medication 60 MG: at 12:38

## 2021-12-06 RX ADMIN — LIDOCAINE HYDROCHLORIDE 60 MG: 20 INJECTION, SOLUTION INFILTRATION; PERINEURAL at 12:38

## 2021-12-06 RX ADMIN — PROPOFOL 180 MCG/KG/MIN: 10 INJECTION, EMULSION INTRAVENOUS at 12:38

## 2021-12-06 RX ADMIN — SODIUM CHLORIDE, POTASSIUM CHLORIDE, SODIUM LACTATE AND CALCIUM CHLORIDE 30 ML/HR: 600; 310; 30; 20 INJECTION, SOLUTION INTRAVENOUS at 12:03

## 2021-12-06 NOTE — H&P
[facility]  Pre Procedure History & Physical    Chief Complaint: Esophageal dysphagia with 2 food bolus removals in the month of October 2021      HPI: 61-year-old woman with past medical history as below here for EGD.  In October, she had to meet food boluses of the esophagus requiring endoscopic removal.  She is here for further evaluation and potential dilation of stricture.    Past Medical History:   Past Medical History:   Diagnosis Date   • Hypertension    • Osteoporosis        Family History:  Family History   Family history unknown: Yes       Social History:   reports that she has been smoking. She has been smoking about 1.00 pack per day. She does not have any smokeless tobacco history on file. She reports current alcohol use. She reports current drug use. Drug: Marijuana.    Medications:   No medications prior to admission.       Allergies:  Lisinopril and Paroxetine    ROS:    Pertinent items are noted in HPI     Objective     There were no vitals taken for this visit.    Physical Exam   Constitutional: Pt is oriented to person, place, and time and well-developed, well-nourished, and in no distress.   HENT:   Mouth/Throat: Oropharynx is clear and moist.   Neck: Normal range of motion. Neck supple.   Cardiovascular: Normal rate, regular rhythm and normal heart sounds.    Pulmonary/Chest: Effort normal and breath sounds normal. No respiratory distress. No  wheezes.   Abdominal: Soft. Bowel sounds are normal.   Skin: Skin is warm and dry.   Psychiatric: Mood, memory, affect and judgment normal.     Assessment/Plan     Diagnosis: Esophageal dysphagia with 2 food bolus removals in the month of October 2021      Anticipated Surgical Procedure:  EGD      The risks, benefits, and alternatives of this procedure have been discussed with the patient or the responsible party- the patient understands and agrees to proceed.

## 2021-12-06 NOTE — ANESTHESIA PREPROCEDURE EVALUATION
Anesthesia Evaluation     Patient summary reviewed and Nursing notes reviewed   no history of anesthetic complications:  NPO Solid Status: > 6 hours  NPO Liquid Status: > 6 hours           Airway   Mallampati: II  TM distance: >3 FB  Neck ROM: full  no difficulty expected and No difficulty expected  Dental - normal exam     Pulmonary - negative pulmonary ROS and normal exam    breath sounds clear to auscultation  (-) rhonchi, decreased breath sounds, wheezes, rales, stridor  Cardiovascular - normal exam    NYHA Classification: I  Rhythm: regular  Rate: normal    (+) hypertension,   (-) murmur, weak pulses, friction rub, systolic click, carotid bruits, JVD, peripheral edema      Neuro/Psych- negative ROS  GI/Hepatic/Renal/Endo - negative ROS     Musculoskeletal     Abdominal  - normal exam    Abdomen: soft.   Substance History - negative use     OB/GYN negative ob/gyn ROS         Other   arthritis,                      Anesthesia Plan    ASA 2     MAC     intravenous induction     Anesthetic plan, all risks, benefits, and alternatives have been provided, discussed and informed consent has been obtained with: patient.

## 2021-12-06 NOTE — DISCHARGE INSTRUCTIONS
For the next 24 hours patient needs to be with a responsible adult.    For 24 hours DO NOT drive, operate machinery, appliances, drink alcohol, make important decisions or sign legal documents.    Start with a light or bland diet if you are feeling sick to your stomach otherwise advance to regular diet as tolerated.    Follow recommendations on procedure report if provided by your doctor.    Call Dr Kumar for problems .  Problems may include but not limited to: large amounts of bleeding, trouble breathing, repeated vomiting, severe unrelieved pain, fever or chills.

## 2021-12-06 NOTE — ANESTHESIA POSTPROCEDURE EVALUATION
"Patient: Rita Christopher    Procedure Summary     Date: 12/06/21 Room / Location:  [facility]    Anesthesia Start: 1233 Anesthesia Stop: 1306    Procedure: ESOPHAGOGASTRODUODENOSCOPY WITH BIOPSIES AND 12MM-15MM BALLOON DILATION (N/A Esophagus) Diagnosis:       Benign esophageal stricture      Esophageal obstruction due to food impaction      (Benign esophageal stricture [K22.2])      (Esophageal obstruction due to food impaction [K22.2, T18.128A])    Surgeons: Connie Kumar MD Provider: Mayra Dc MD    Anesthesia Type: MAC ASA Status: 2          Anesthesia Type: MAC    Vitals  Vitals Value Taken Time   /82 12/06/21 1301   Temp     Pulse 84 12/06/21 1301   Resp 16 12/06/21 1301   SpO2 97 % 12/06/21 1301           Post Anesthesia Care and Evaluation    Patient location during evaluation: bedside  Patient participation: complete - patient participated  Level of consciousness: awake  Pain management: adequate  Airway patency: patent  Anesthetic complications: No anesthetic complications    Cardiovascular status: acceptable  Respiratory status: acceptable  Hydration status: acceptable    Comments: /82 (BP Location: Left arm, Patient Position: Lying)   Pulse 84   Resp 16   Ht 157.5 cm (62\")   Wt 49.6 kg (109 lb 4.8 oz)   SpO2 97%   BMI 19.99 kg/m²       "

## 2021-12-07 LAB
LAB AP CASE REPORT: NORMAL
PATH REPORT.FINAL DX SPEC: NORMAL
PATH REPORT.GROSS SPEC: NORMAL

## 2021-12-08 ENCOUNTER — TELEPHONE (OUTPATIENT)
Dept: GASTROENTEROLOGY | Facility: CLINIC | Age: 61
End: 2021-12-08

## 2021-12-08 RX ORDER — PANTOPRAZOLE SODIUM 40 MG/1
40 TABLET, DELAYED RELEASE ORAL DAILY
Qty: 90 TABLET | Refills: 2 | Status: SHIPPED | OUTPATIENT
Start: 2021-12-08 | End: 2022-02-21 | Stop reason: SDUPTHER

## 2021-12-08 NOTE — PROGRESS NOTES
Biopsies from her EGD show mild duodenitis, changes of reflux esophagitis, chronic lymphocytic esophagitis which is what is contributing to her swallowing issue.    She should continue the daily pantoprazole.  I have sent more to her pharmacy.

## 2021-12-08 NOTE — TELEPHONE ENCOUNTER
----- Message from Connie Kumar MD sent at 12/8/2021  2:35 PM EST -----  Biopsies from her EGD show mild duodenitis, changes of reflux esophagitis, chronic lymphocytic esophagitis which is what is contributing to her swallowing issue.    She should continue the daily pantoprazole.  I have sent more to her pharmacy.

## 2021-12-08 NOTE — TELEPHONE ENCOUNTER
Reece Flores'Tiffanie, RegSched Rep  [facility] [address]  Phone Number: [phone]     Pt is returning phone call. She works 3rd shift so she stated she would like to get a very detailed voice message if she's unable to be reached.     Called pt and on vm advised of Dr Kumar note.  Advised to call with questions.

## 2022-02-21 ENCOUNTER — TELEPHONE (OUTPATIENT)
Dept: GASTROENTEROLOGY | Facility: CLINIC | Age: 62
End: 2022-02-21

## 2022-02-21 RX ORDER — PANTOPRAZOLE SODIUM 40 MG/1
40 TABLET, DELAYED RELEASE ORAL DAILY
Qty: 90 TABLET | Refills: 1 | Status: SHIPPED | OUTPATIENT
Start: 2022-02-21 | End: 2022-08-10

## 2022-02-21 NOTE — TELEPHONE ENCOUNTER
See pantoprazole 40 mg 1 tab po daily rx of 12/8/21 for #90, R2 to [facility] completed as above, #90, R1 to [facility] in [place].   Call to pt.  Advise of above.

## 2022-02-21 NOTE — TELEPHONE ENCOUNTER
----- Message from Artemio Rojo sent at 2/21/2022  2:28 PM EST -----  Regarding: pantoprazole (PROTONIX) 40 MG EC tablet  Contact: [phone]  Pt is calling says she needs a refill on this medication. Updated pharmacy has been added

## 2022-08-10 RX ORDER — PANTOPRAZOLE SODIUM 40 MG/1
TABLET, DELAYED RELEASE ORAL
Qty: 30 TABLET | Refills: 5 | Status: SHIPPED | OUTPATIENT
Start: 2022-08-10

## 2022-10-07 NOTE — TELEPHONE ENCOUNTER
----- Message from Connie Kumar MD sent at 10/11/2021 12:09 PM EDT -----  Gab f/u Mignon 2 weeks, x    
Spoke with pt schedule on oct 28 arrive at 1015 am [facility]  
[de-identified] : CT Neck : No parotid mass or lesion. No aerodigestive tract mass or lesion. Minimal sinus disease.\par \par MRI brain/IAC: no CPA or IAC mass.

## 2025-01-09 ENCOUNTER — HOSPITAL ENCOUNTER (EMERGENCY)
Facility: HOSPITAL | Age: 65
Discharge: HOME | End: 2025-01-09
Attending: EMERGENCY MEDICINE
Payer: COMMERCIAL

## 2025-01-09 ENCOUNTER — APPOINTMENT (OUTPATIENT)
Dept: CT IMAGING | Facility: HOSPITAL | Age: 65
End: 2025-01-09
Payer: COMMERCIAL

## 2025-01-09 VITALS
DIASTOLIC BLOOD PRESSURE: 82 MMHG | SYSTOLIC BLOOD PRESSURE: 148 MMHG | RESPIRATION RATE: 16 BRPM | HEART RATE: 76 BPM | OXYGEN SATURATION: 96 % | TEMPERATURE: 98.9 F

## 2025-01-09 DIAGNOSIS — S09.90XA CLOSED HEAD INJURY, INITIAL ENCOUNTER: Primary | ICD-10-CM

## 2025-01-09 DIAGNOSIS — F10.920 ALCOHOLIC INTOXICATION WITHOUT COMPLICATION: ICD-10-CM

## 2025-01-09 PROCEDURE — 70450 CT HEAD/BRAIN W/O DYE: CPT

## 2025-01-09 PROCEDURE — 72125 CT NECK SPINE W/O DYE: CPT

## 2025-01-09 PROCEDURE — 99284 EMERGENCY DEPT VISIT MOD MDM: CPT

## 2025-01-09 NOTE — ED TRIAGE NOTES
Patient to ED via EMS from home. Patient had a mechanical fall and hit her head on the floor. Patient denies LOC. Patient reported to EMS that she is intoxicated.

## 2025-01-10 NOTE — ED PROVIDER NOTES
EMERGENCY DEPARTMENT ENCOUNTER  Room Number:  37/37  PCP: Patience Brown MD  Independent Historians: Patient      HPI:  Chief Complaint: had concerns including Fall and Alcohol Intoxication.     A complete HPI/ROS/PMH/PSH/SH/FH are unobtainable due to: None    Chronic or social conditions impacting patient care (Social Determinants of Health): None      Context: Rita Christopher is a 64 y.o. female with a medical history of hypertension and osteoporosis presents emergency department after mechanical fall at home.  Patient has been drinking alcohol today and tripped on a rug and fell hitting her head on the floor.  She denies LOC.  Her  called EMS.  She says she does drink alcohol daily.  She denies any other injuries associated with the fall and has been ambulatory and tolerating p.o. here in the emergency department.  She denies headaches, dizziness, nausea, or vomiting.  She denies neck or back pain.      Review of prior external notes (non-ED) -and- Review of prior external test results outside of this encounter:   Labs from 6/24/2024, hemoglobin 17, creatinine 0.55 on 5/24/2024      PAST MEDICAL HISTORY  Active Ambulatory Problems     Diagnosis Date Noted    Esophageal obstruction due to food impaction 10/11/2021    Food bolus obstruction of intestine 10/30/2021    HTN (hypertension) 10/31/2021    Osteoporosis 10/31/2021    Hypokalemia 10/31/2021     Resolved Ambulatory Problems     Diagnosis Date Noted    No Resolved Ambulatory Problems     Past Medical History:   Diagnosis Date    Hypertension          PAST SURGICAL HISTORY  Past Surgical History:   Procedure Laterality Date    ENDOSCOPY N/A 10/11/2021    Procedure: ESOPHAGOGASTRODUODENOSCOPY with possible interventions  ;  Surgeon: Connie Kumar MD;  Location: [facility];  Service: Gastroenterology;  Laterality: N/A;  food bolus   post op -food bolus espophogeal stricture at 16cm and mild gastritis    ENDOSCOPY N/A 10/31/2021     Procedure: ESOPHAGOGASTRODUODENOSCOPY FOR FOOD BOLUS;  Surgeon: Osmani Marshall MD;  Location: [facility] MAIN OR;  Service: Gastroenterology;  Laterality: N/A;  pre-food bolus  post-esophageal trauma, esophageal stricture    ENDOSCOPY N/A 12/6/2021    Procedure: ESOPHAGOGASTRODUODENOSCOPY WITH BIOPSIES AND 12MM-15MM BALLOON DILATION;  Surgeon: Connie Kumar MD;  Location: [facility] ENDOSCOPY;  Service: Gastroenterology;  Laterality: N/A;  PRE- HISTORY ESOPHAGEAL FOOD BOLUS  POST- SLIDING HIATAL HERNIA, GASTRITIS, IRREGULAR ZLINE    FEMUR FRACTURE SURGERY      TUBAL ABDOMINAL LIGATION           FAMILY HISTORY  Family History   Family history unknown: Yes         SOCIAL HISTORY  Social History     Socioeconomic History    Marital status: Single   Tobacco Use    Smoking status: Every Day     Current packs/day: 1.00     Types: Cigarettes   Substance and Sexual Activity    Alcohol use: Yes     Comment: 2-3 beers per day    Drug use: Yes     Types: Marijuana    Sexual activity: Defer         ALLERGIES  Paroxetine and Lisinopril      REVIEW OF SYSTEMS  Included in HPI  All systems reviewed and negative except for those discussed in HPI.      PHYSICAL EXAM    I have reviewed the triage vital signs and nursing notes.    ED Triage Vitals   Temp Heart Rate Resp BP SpO2   01/09/25 1840 01/09/25 1837 01/09/25 1837 01/09/25 1837 01/09/25 1837   98.9 °F (37.2 °C) 88 16 144/92 97 %      Temp src Heart Rate Source Patient Position BP Location FiO2 (%)   -- -- -- -- --              Physical Exam  Constitutional:       General: She is not in acute distress.     Appearance: Normal appearance.      Comments: Disheveled, poor hygiene   HENT:      Head: Normocephalic.      Comments: 2 superficial abrasions to the right side of the forehead, no deep laceration     Nose: Nose normal.      Mouth/Throat:      Mouth: Mucous membranes are moist.   Eyes:      Extraocular Movements: Extraocular movements intact.      Pupils: Pupils are equal,  round, and reactive to light.   Cardiovascular:      Rate and Rhythm: Normal rate and regular rhythm.      Pulses: Normal pulses.      Heart sounds: Normal heart sounds.   Pulmonary:      Effort: Pulmonary effort is normal. No respiratory distress.      Breath sounds: Normal breath sounds.   Abdominal:      General: Abdomen is flat. There is no distension.      Palpations: Abdomen is soft.      Tenderness: There is no abdominal tenderness.   Musculoskeletal:         General: Normal range of motion.      Cervical back: Normal range of motion and neck supple.      Comments: Spontaneously moving all extremities, no C, T, or L-spine tenderness.  Sensory and motor grossly intact.  Patient is ambulatory here in the ED.   Skin:     General: Skin is warm and dry.      Capillary Refill: Capillary refill takes less than 2 seconds.   Neurological:      General: No focal deficit present.      Mental Status: She is alert and oriented to person, place, and time.   Psychiatric:         Mood and Affect: Mood normal.         Behavior: Behavior normal.         RADIOLOGY  CT Head Without Contrast, CT Cervical Spine Without Contrast    Result Date: 1/9/2025  CT HEAD WO CONTRAST-, CT CERVICAL SPINE WO CONTRAST-  INDICATIONS: Trauma  TECHNIQUE: Radiation dose reduction techniques were utilized, including automated exposure control and exposure modulation based on body size. Noncontrast head CT, cervical spine CT  COMPARISON: None available  FINDINGS:  Head CT:  No acute intracranial hemorrhage, midline shift or mass effect. No acute territorial infarct is identified. Encephalomalacia is apparent centrally in the ana.  Mild periventricular hypodensities suggest chronic small vessel ischemic change in a patient this age.  Arterial calcifications are seen at the base of the brain.  Ventricles, cisterns, cerebral sulci are unremarkable for patient age.  The visualized paranasal sinuses, orbits, mastoid air cells are unremarkable.   Cervical  spine CT:  No acute fracture is identified.  Slight/borderline retrolisthesis of C2 on C3. Alignment is otherwise in range of normal.  Assessment of intracanalicular contents is limited without intrathecal contrast material, no high-grade central stenosis is identified, but there appears to be least mild central stenosis related to disc osteophyte complex at C2/3, C3/4, C6/7.  Bilateral carotid arterial calcifications are present.  Small fibronodular and emphysematous changes are seen at the lung apices. Subcentimeter groundglass nodular densities at the apices, left more than right, are indeterminate, suggest 6-month follow-up chest CT to characterize change (possibility of groundglass nodules not excluded).        No acute intracranial hemorrhage or hydrocephalus; chronic appearing changes of the brain. No acute cervical fracture identified; degenerative changes in the cervical spine. If there is further clinical concern, MRI could be considered for further evaluation.  Small nonspecific groundglass densities in the upper lungs, follow-up advised.  This report was finalized on 1/9/2025 7:53 PM by Dr. Last Diaz M.D on Workstation: [id number]         OUTPATIENT MEDICATION MANAGEMENT:  No current Epic-ordered facility-administered medications on file.     Current Outpatient Medications Ordered in Epic   Medication Sig Dispense Refill    Alendronate Sodium (FOSAMAX PO) Take  by mouth 1 (One) Time Per Week.      amLODIPine (NORVASC) 10 MG tablet 10 mg.      cholecalciferol (VITAMIN D3) 1000 units tablet Take 1,000 Units by mouth Daily.      HYDROCHLOROTHIAZIDE PO Take  by mouth Daily.      pantoprazole (PROTONIX) 40 MG EC tablet TAKE 1 TABLET BY MOUTH EVERY DAY 30 tablet 5    POTASSIUM PO Take  by mouth.      sucralfate (CARAFATE) 1 g tablet Take 1 tablet by mouth 4 (Four) Times a Day Before Meals & at Bedtime. 120 tablet 0           PROGRESS, DATA ANALYSIS, CONSULTS, AND MEDICAL DECISION MAKING  ORDERS PLACED  DURING THIS VISIT:  Orders Placed This Encounter   Procedures    CT Head Without Contrast    CT Cervical Spine Without Contrast       All labs have been independently interpreted by me.  All radiology studies have been reviewed by me. All EKG's have been independently viewed and interpreted by me.  Discussion below represents my analysis of pertinent findings related to patient's condition, differential diagnosis, treatment plan and final disposition.    Differential diagnosis includes but is not limited to:   My differential diagnosis includes but is not limited to cerebral contusion, cervical strain, concussion with LOC, concussion without LOC, contusion, fracture of the skull, orbits or mandible, hematoma, intracranial hemorrhage including subdural, epidural, subarachnoid and intracerebral, laceration and postconcussion syndrome       ED Course:  ED Course as of 01/09/25 2137   Thu Jan 09, 2025 1941 First look: Patient presents for mechanical fall at home and hitting her forehead.  She denies loss of consciousness.  Patient admits to drinking alcohol.  Patient denies taking any blood thinners and there are none listed on her medication list.  Small abrasions to her forehead.  She is moving all extremities.  Will obtain CT head and cervical spine. [DC]   2011 CT Head Without Contrast  My independent interpretation of the head CT is no intracranial hemorrhage.   [CC]   2051 I discussed the case with Dr. Reynoso and she agrees to evaluate the patient at the bedside.    [CC]   2125 Patient has been observed for an extended period of time here in the emergency department and is now ambulatory, tolerating p.o., and speaking in clear sentences.  Patient is clinically sober.  Patient says her  is coming to pick her up.  Discharge pending on safe, sober ride. [CC]      ED Course User Index  [CC] Tiffany Thurston PA-C  [DC] Keya Simeon PA           AS OF 21:37 EST VITALS:    BP - 148/82  HR - 76  TEMP - 98.9 °F  (37.2 °C)  O2 SATS - 96%      MDM:  64-year-old female presents emergency department today after mechanical fall at home where she hit her head.  On arrival here in the emergency department vitals are reassuring, she is afebrile.  My exam the patient has 2 superficial abrasions to the right side of the forehead.  She does admit to drinking alcohol today but is clinically sober.  She was evaluated with CT of her head and cervical spine which were negative for acute traumatic injuries.  Patient was able to ambulate and tolerate p.o. here in the emergency department and  will come pick her up.  Head injury instructions were discussed.  Patient will be discharged with outpatient follow-up.        DIAGNOSIS  Final diagnoses:   Closed head injury, initial encounter   Alcoholic intoxication without complication         DISPOSITION  ED Disposition       ED Disposition   Discharge    Condition   Stable    Comment   --                    Please note that portions of this document were completed with a voice recognition program.    Note Disclaimer: At [facility], we believe that sharing information builds trust and better relationships. You are receiving this note because you recently visited [facility]. It is possible you will see health information before a provider has talked with you about it. This kind of information can be easy to misunderstand. To help you fully understand what it means for your health, we urge you to discuss this note with your provider.     Tiffany Thurston PA-C  01/09/25 6008

## 2025-01-10 NOTE — ED PROVIDER NOTES
I have personally performed a face-to-face diagnostic evaluation of the patient.  I have reviewed and agree with the care plan as outlined by NP/PA.  My findings are as follows:    HPI:  Patient is a 64 y.o. female who presents with a head injury.  She has been drinking today and tripped on a rug, falling and hitting her head on the floor.  She is a daily drinker.  She is currently denying headache, neck pain, chest wall pain, abdominal pain or back pain.  She deficits to have a small abrasion to her forehead.      PE:  Physical Exam  Constitutional:       Appearance: Normal appearance.   HENT:      Head: Normocephalic.      Comments: Abrasion right forehead  Eyes:      Conjunctiva/sclera: Conjunctivae normal.   Pulmonary:      Effort: Pulmonary effort is normal. No respiratory distress.   Chest:      Chest wall: No tenderness.   Abdominal:      Tenderness: There is no abdominal tenderness. There is no guarding or rebound.   Musculoskeletal:         General: No tenderness or deformity.   Neurological:      Mental Status: She is alert and oriented to person, place, and time.           MDM:  I provided a substantiate portion of the care of this patient. I personally performed the medical decision making.    Medical records are reviewed in [facility] and [facility] Everywhere, if applicable      No results found for this or any previous visit (from the past 24 hours).  I have reviewed the above labs    CT Head Without Contrast, CT Cervical Spine Without Contrast    Result Date: 1/9/2025  CT HEAD WO CONTRAST-, CT CERVICAL SPINE WO CONTRAST-  INDICATIONS: Trauma  TECHNIQUE: Radiation dose reduction techniques were utilized, including automated exposure control and exposure modulation based on body size. Noncontrast head CT, cervical spine CT  COMPARISON: None available  FINDINGS:  Head CT:  No acute intracranial hemorrhage, midline shift or mass effect. No acute territorial infarct is identified. Encephalomalacia is apparent  centrally in the ana.  Mild periventricular hypodensities suggest chronic small vessel ischemic change in a patient this age.  Arterial calcifications are seen at the base of the brain.  Ventricles, cisterns, cerebral sulci are unremarkable for patient age.  The visualized paranasal sinuses, orbits, mastoid air cells are unremarkable.   Cervical spine CT:  No acute fracture is identified.  Slight/borderline retrolisthesis of C2 on C3. Alignment is otherwise in range of normal.  Assessment of intracanalicular contents is limited without intrathecal contrast material, no high-grade central stenosis is identified, but there appears to be least mild central stenosis related to disc osteophyte complex at C2/3, C3/4, C6/7.  Bilateral carotid arterial calcifications are present.  Small fibronodular and emphysematous changes are seen at the lung apices. Subcentimeter groundglass nodular densities at the apices, left more than right, are indeterminate, suggest 6-month follow-up chest CT to characterize change (possibility of groundglass nodules not excluded).        No acute intracranial hemorrhage or hydrocephalus; chronic appearing changes of the brain. No acute cervical fracture identified; degenerative changes in the cervical spine. If there is further clinical concern, MRI could be considered for further evaluation.  Small nonspecific groundglass densities in the upper lungs, follow-up advised.  This report was finalized on 1/9/2025 7:53 PM by Dr. Last Diaz M.D on Workstation: [id number]       My independent interpretation of the ct head: no ich     This is an overall well-appearing 64-year-old female who is presenting today after mechanical fall resulting in a closed head injury.  She looks well.  She presents afebrile with unremarkable vital signs.  She is a GCS of 15 and no neurologic deficits.  She is largely asymptomatic at the time of my evaluation.  Given head injury and intoxication, not able to clear by  decision criteria.  She was therefore evaluated with a CT scan of the head and cervical spine which did not demonstrate any acute traumatic abnormalities.  She furthermore does not have any tenderness to the chest, abdomen, back or extremities she does need for further imaging.  Therefore, given clinical sobriety, negative imaging, the patient is appropriate for discharge with further outpatient reevaluation      Impression:  Final diagnoses:   Closed head injury, initial encounter   Alcoholic intoxication without complication         Disposition:  ED Disposition       ED Disposition   Discharge    Condition   Stable    Comment   --                Winnie Reynoso MD  01/09/25 7799

## (undated) DEVICE — TUBING, SUCTION, 1/4" X 10', STRAIGHT: Brand: MEDLINE

## (undated) DEVICE — ADAPT CLN BIOGUARD AIR/H2O DISP

## (undated) DEVICE — BITEBLOCK OMNI BLOC

## (undated) DEVICE — LN SMPL CO2 SHTRM SD STREAM W/M LUER

## (undated) DEVICE — KT ORCA ORCAPOD DISP STRL

## (undated) DEVICE — SENSR O2 OXIMAX FNGR A/ 18IN NONSTR

## (undated) DEVICE — RETRV ROTH NET PLAT UNIV

## (undated) DEVICE — CANN O2 ETCO2 FITS ALL CONN CO2 SMPL A/ 7IN DISP LF

## (undated) DEVICE — DEV INFL CRE STERIFLATE 60CC DISP

## (undated) DEVICE — FRCP BX RADJAW4 NDL 2.8 240CM LG OG BX40

## (undated) DEVICE — ESOPHAGEAL BALLOON DILATATION CATHETER: Brand: CRE FIXED WIRE

## (undated) DEVICE — THE DISPOSABLE RAPTOR GRASPING DEVICE IS USED TO GRASP TISSUE AND/OR RETRIEVE FOREIGN BODIES, EXCISED TISSUE AND STENTS DURING ENDOSCOPIC PROCEDURES.: Brand: RAPTOR